# Patient Record
Sex: FEMALE | Race: WHITE | NOT HISPANIC OR LATINO | Employment: OTHER | URBAN - METROPOLITAN AREA
[De-identification: names, ages, dates, MRNs, and addresses within clinical notes are randomized per-mention and may not be internally consistent; named-entity substitution may affect disease eponyms.]

---

## 2018-08-03 ENCOUNTER — RECORDS - HEALTHEAST (OUTPATIENT)
Dept: LAB | Facility: CLINIC | Age: 83
End: 2018-08-03

## 2018-08-03 LAB
ANION GAP SERPL CALCULATED.3IONS-SCNC: 13 MMOL/L (ref 5–18)
BUN SERPL-MCNC: 24 MG/DL (ref 8–28)
CALCIUM SERPL-MCNC: 10.5 MG/DL (ref 8.5–10.5)
CHLORIDE BLD-SCNC: 107 MMOL/L (ref 98–107)
CO2 SERPL-SCNC: 21 MMOL/L (ref 22–31)
CREAT SERPL-MCNC: 1.06 MG/DL (ref 0.6–1.1)
GFR SERPL CREATININE-BSD FRML MDRD: 49 ML/MIN/1.73M2
GLUCOSE BLD-MCNC: 135 MG/DL (ref 70–125)
POTASSIUM BLD-SCNC: 3.6 MMOL/L (ref 3.5–5)
SODIUM SERPL-SCNC: 141 MMOL/L (ref 136–145)

## 2018-08-09 ENCOUNTER — COMMUNICATION - HEALTHEAST (OUTPATIENT)
Dept: OTHER | Facility: CLINIC | Age: 83
End: 2018-08-09

## 2018-08-09 ENCOUNTER — HOSPITAL ENCOUNTER (OUTPATIENT)
Dept: PHYSICAL MEDICINE AND REHAB | Facility: CLINIC | Age: 83
Discharge: HOME | End: 2018-08-09
Attending: PAIN MEDICINE

## 2018-08-09 DIAGNOSIS — M80.00XA AGE-RELATED OSTEOPOROSIS WITH CURRENT PATHOLOGICAL FRACTURE: ICD-10-CM

## 2018-08-09 DIAGNOSIS — S22.000A COMPRESSION FRACTURE OF THORACIC VERTEBRA (H): ICD-10-CM

## 2018-08-09 DIAGNOSIS — R29.2 HYPERREFLEXIA: ICD-10-CM

## 2018-08-09 DIAGNOSIS — K59.03 DRUG-INDUCED CONSTIPATION: ICD-10-CM

## 2018-08-09 RX ORDER — AMOXICILLIN 250 MG
2 CAPSULE ORAL DAILY
Refills: 0 | Status: SHIPPED | COMMUNITY
Start: 2018-08-09 | End: 2024-08-17

## 2018-08-09 RX ORDER — CALCITONIN SALMON 200 [IU]/.09ML
1 SPRAY, METERED NASAL DAILY
Qty: 3.7 ML | Refills: 0 | Status: SHIPPED | OUTPATIENT
Start: 2018-08-09 | End: 2024-08-17

## 2018-08-09 RX ORDER — AMLODIPINE BESYLATE 5 MG/1
5 TABLET ORAL DAILY
Status: SHIPPED | COMMUNITY
Start: 2018-08-09 | End: 2024-08-17

## 2018-08-09 RX ORDER — NAPROXEN SODIUM 220 MG
220 TABLET ORAL 2 TIMES DAILY WITH MEALS
Status: SHIPPED | COMMUNITY
Start: 2018-08-09 | End: 2024-08-17

## 2018-08-09 RX ORDER — POLYETHYLENE GLYCOL 3350 17 G/17G
17 POWDER, FOR SOLUTION ORAL DAILY
Status: SHIPPED | COMMUNITY
Start: 2018-08-09 | End: 2024-08-17

## 2018-08-09 RX ORDER — HYDROCODONE BITARTRATE AND ACETAMINOPHEN 5; 325 MG/1; MG/1
1 TABLET ORAL EVERY 4 HOURS PRN
Qty: 20 TABLET | Refills: 0 | Status: SHIPPED | OUTPATIENT
Start: 2018-08-09 | End: 2024-08-17

## 2018-08-09 RX ORDER — ACETAMINOPHEN 325 MG/1
650 TABLET ORAL EVERY 6 HOURS PRN
Status: SHIPPED | COMMUNITY
Start: 2018-08-09 | End: 2024-08-17

## 2018-08-09 ASSESSMENT — MIFFLIN-ST. JEOR: SCORE: 995.72

## 2018-08-10 ENCOUNTER — AMBULATORY - HEALTHEAST (OUTPATIENT)
Dept: NEUROSURGERY | Facility: CLINIC | Age: 83
End: 2018-08-10

## 2018-08-10 ENCOUNTER — AMBULATORY - HEALTHEAST (OUTPATIENT)
Dept: PHYSICAL MEDICINE AND REHAB | Facility: CLINIC | Age: 83
End: 2018-08-10

## 2018-08-10 ENCOUNTER — COMMUNICATION - HEALTHEAST (OUTPATIENT)
Dept: NEUROSURGERY | Facility: CLINIC | Age: 83
End: 2018-08-10

## 2018-08-10 ENCOUNTER — RECORDS - HEALTHEAST (OUTPATIENT)
Dept: ADMINISTRATIVE | Facility: OTHER | Age: 83
End: 2018-08-10

## 2018-08-10 DIAGNOSIS — S22.029A: ICD-10-CM

## 2018-08-10 DIAGNOSIS — S22.089A UNSPECIFIED FRACTURE OF T11-T12 VERTEBRA, INITIAL ENCOUNTER FOR CLOSED FRACTURE (H): ICD-10-CM

## 2018-08-14 ENCOUNTER — HOSPITAL ENCOUNTER (OUTPATIENT)
Dept: RADIOLOGY | Facility: HOSPITAL | Age: 83
Discharge: HOME | End: 2018-08-14
Attending: SURGERY

## 2018-08-14 ENCOUNTER — HOSPITAL ENCOUNTER (OUTPATIENT)
Dept: CT IMAGING | Facility: HOSPITAL | Age: 83
Discharge: HOME | End: 2018-08-14
Attending: SURGERY

## 2018-08-14 DIAGNOSIS — S22.029A: ICD-10-CM

## 2018-08-15 ENCOUNTER — COMMUNICATION - HEALTHEAST (OUTPATIENT)
Dept: ADMINISTRATIVE | Facility: CLINIC | Age: 83
End: 2018-08-15

## 2018-08-15 ENCOUNTER — OFFICE VISIT - HEALTHEAST (OUTPATIENT)
Dept: NEUROSURGERY | Facility: CLINIC | Age: 83
End: 2018-08-15

## 2018-08-15 DIAGNOSIS — S22.001A CLOSED BURST FRACTURE OF THORACIC VERTEBRA, INITIAL ENCOUNTER (H): ICD-10-CM

## 2018-08-15 ASSESSMENT — MIFFLIN-ST. JEOR: SCORE: 979.74

## 2018-08-17 ENCOUNTER — COMMUNICATION - HEALTHEAST (OUTPATIENT)
Dept: NEUROSURGERY | Facility: CLINIC | Age: 83
End: 2018-08-17

## 2018-08-17 ENCOUNTER — AMBULATORY - HEALTHEAST (OUTPATIENT)
Dept: ENDOCRINOLOGY | Facility: CLINIC | Age: 83
End: 2018-08-17

## 2018-08-17 DIAGNOSIS — M81.0 SENILE OSTEOPOROSIS: ICD-10-CM

## 2018-08-28 ENCOUNTER — COMMUNICATION - HEALTHEAST (OUTPATIENT)
Dept: NEUROSURGERY | Facility: CLINIC | Age: 83
End: 2018-08-28

## 2018-09-11 ENCOUNTER — COMMUNICATION - HEALTHEAST (OUTPATIENT)
Dept: ENDOCRINOLOGY | Facility: CLINIC | Age: 83
End: 2018-09-11

## 2018-09-26 ENCOUNTER — OFFICE VISIT - HEALTHEAST (OUTPATIENT)
Dept: NEUROSURGERY | Facility: CLINIC | Age: 83
End: 2018-09-26

## 2018-09-26 ENCOUNTER — HOSPITAL ENCOUNTER (OUTPATIENT)
Dept: RADIOLOGY | Facility: HOSPITAL | Age: 83
Discharge: HOME | End: 2018-09-26
Attending: SURGERY

## 2018-09-26 ENCOUNTER — HOSPITAL ENCOUNTER (OUTPATIENT)
Dept: CT IMAGING | Facility: HOSPITAL | Age: 83
Discharge: HOME | End: 2018-09-26
Attending: SURGERY

## 2018-09-26 DIAGNOSIS — S22.001A CLOSED BURST FRACTURE OF THORACIC VERTEBRA, INITIAL ENCOUNTER (H): ICD-10-CM

## 2018-09-26 DIAGNOSIS — S22.001A BURST FRACTURE OF THORACIC VERTEBRA (H): ICD-10-CM

## 2018-09-26 RX ORDER — ASPIRIN 81 MG/1
81 TABLET, CHEWABLE ORAL DAILY
Status: ON HOLD | COMMUNITY
Start: 2018-09-26 | End: 2024-08-17

## 2018-09-26 ASSESSMENT — MIFFLIN-ST. JEOR: SCORE: 979.74

## 2018-11-12 ENCOUNTER — HOSPITAL ENCOUNTER (OUTPATIENT)
Dept: CT IMAGING | Facility: HOSPITAL | Age: 83
Discharge: HOME | End: 2018-11-12
Attending: SURGERY

## 2018-11-12 ENCOUNTER — COMMUNICATION - HEALTHEAST (OUTPATIENT)
Dept: TELEHEALTH | Facility: CLINIC | Age: 83
End: 2018-11-12

## 2018-11-12 ENCOUNTER — OFFICE VISIT - HEALTHEAST (OUTPATIENT)
Dept: NEUROSURGERY | Facility: CLINIC | Age: 83
End: 2018-11-12

## 2018-11-12 ENCOUNTER — HOSPITAL ENCOUNTER (OUTPATIENT)
Dept: RADIOLOGY | Facility: HOSPITAL | Age: 83
Discharge: HOME | End: 2018-11-12
Attending: SURGERY

## 2018-11-12 DIAGNOSIS — S22.001A BURST FRACTURE OF THORACIC VERTEBRA (H): ICD-10-CM

## 2018-11-12 DIAGNOSIS — S22.001G: ICD-10-CM

## 2018-11-12 ASSESSMENT — MIFFLIN-ST. JEOR: SCORE: 979.74

## 2018-11-13 ENCOUNTER — COMMUNICATION - HEALTHEAST (OUTPATIENT)
Dept: NEUROSURGERY | Facility: CLINIC | Age: 83
End: 2018-11-13

## 2018-11-15 ENCOUNTER — COMMUNICATION - HEALTHEAST (OUTPATIENT)
Dept: NEUROSURGERY | Facility: CLINIC | Age: 83
End: 2018-11-15

## 2018-12-20 ENCOUNTER — OFFICE VISIT - HEALTHEAST (OUTPATIENT)
Dept: ENDOCRINOLOGY | Facility: CLINIC | Age: 83
End: 2018-12-20

## 2018-12-20 DIAGNOSIS — M81.0 SENILE OSTEOPOROSIS: ICD-10-CM

## 2018-12-20 ASSESSMENT — MIFFLIN-ST. JEOR: SCORE: 916.23

## 2019-01-21 ENCOUNTER — RECORDS - HEALTHEAST (OUTPATIENT)
Dept: ADMINISTRATIVE | Facility: OTHER | Age: 84
End: 2019-01-21

## 2019-03-06 ENCOUNTER — RECORDS - HEALTHEAST (OUTPATIENT)
Dept: LAB | Facility: CLINIC | Age: 84
End: 2019-03-06

## 2019-03-07 LAB
ANION GAP SERPL CALCULATED.3IONS-SCNC: 12 MMOL/L (ref 5–18)
BUN SERPL-MCNC: 24 MG/DL (ref 8–28)
CALCIUM SERPL-MCNC: 9.9 MG/DL (ref 8.5–10.5)
CHLORIDE BLD-SCNC: 107 MMOL/L (ref 98–107)
CO2 SERPL-SCNC: 25 MMOL/L (ref 22–31)
CREAT SERPL-MCNC: 1.14 MG/DL (ref 0.6–1.1)
GFR SERPL CREATININE-BSD FRML MDRD: 45 ML/MIN/1.73M2
GLUCOSE BLD-MCNC: 119 MG/DL (ref 70–125)
POTASSIUM BLD-SCNC: 3.6 MMOL/L (ref 3.5–5)
SODIUM SERPL-SCNC: 144 MMOL/L (ref 136–145)

## 2021-06-01 VITALS — BODY MASS INDEX: 25.01 KG/M2 | HEIGHT: 62 IN | WEIGHT: 135.9 LBS

## 2021-06-01 VITALS — HEIGHT: 61 IN | BODY MASS INDEX: 25.49 KG/M2 | WEIGHT: 135 LBS

## 2021-06-02 VITALS — HEIGHT: 59 IN | WEIGHT: 128 LBS | BODY MASS INDEX: 25.8 KG/M2

## 2021-06-02 VITALS — WEIGHT: 135 LBS | HEIGHT: 61 IN | BODY MASS INDEX: 25.49 KG/M2

## 2021-06-02 VITALS — BODY MASS INDEX: 25.49 KG/M2 | WEIGHT: 135 LBS | HEIGHT: 61 IN

## 2021-06-16 PROBLEM — M81.0 SENILE OSTEOPOROSIS: Status: ACTIVE | Noted: 2018-08-17

## 2021-06-19 NOTE — PROGRESS NOTES
Neurosurgery consultation was requested by: Dr.Bill Dailey   Pain: lower back   Radicular Pain is present: lower back left leg down to her left foot and toes   Lhermitte sign: no  Motor complaints: no  Sensory complaints: left foot and toes when she wakes up  Gait and balance issues: no  Bowel or bladder issues: yes   Duration of SX is: 1 month   The symptoms are worse with: waking up   The symptoms are better with: sitting and ling down   Injury: fall and her  had stroke and tried to lift him up   Severity is: mild to moderate   Patient has tried the following conservative measures: none   ANGELIA score is: 58%  DOYLE Francisco MA

## 2021-06-19 NOTE — PROGRESS NOTES
ASSESSMENT: Lora Ansari is a 85 y.o. female who presents for consultation at the request of HE PCP Stephen Becker MD, with a past medical history significant for seizures and essential hypertension who presents today for new patient evaluation of T2 compression fracture and T12 burst compression fracture with retropulsion and severe low back pain:    -With the compression fracture T12 having retropulsion into the spinal canal as well as hyperreflexia at right L4 and S1 I have ordered a TLSO that she should wear at all times except for lying in bed until she sees neurosurgery.  She does have full strength and her toes are downgoing.  We discussed the importance of not flexing at the lumbar spine and not lifting any more than a gallon of milk.      ANGELIA Score: 62    WHO 5: 15       Diagnoses and all orders for this visit:    Compression fracture of thoracic vertebra (H)  -     HYDROcodone-acetaminophen 5-325 mg per tablet; Take 1 tablet by mouth every 4 (four) hours as needed for pain.  Dispense: 20 tablet; Refill: 0  -     Ambulatory referral to Neurosurgery  -     Ambulatory referral for Orthotics / Prosthetics [facility]  -     calcitonin, salmon, (MIACALCIN) 200 unit/actuation nasal spray; Apply 1 spray into alternating nostrils daily.  Dispense: 3.7 mL; Refill: 0    Hyperreflexia  -     Ambulatory referral to Neurosurgery  -     Ambulatory referral for Orthotics / Prosthetics [facility]    Drug-induced constipation  -     senna-docusate (SENNOSIDES-DOCUSATE SODIUM) 8.6-50 mg tablet; Take 2 tablets by mouth daily.; Refill: 0    Age-related osteoporosis with current pathological fracture  -     Ambulatory referral to Osteoporosis Care      PLAN:  Reviewed spine anatomy and disease process. Discussed diagnosis and treatment options with the patient today. A shared decision making model was used.  The patient's values and choices were respected. The following represents what was discussed and decided upon by  the provider and the patient.      -DIAGNOSTIC TESTS:  Images were personally reviewed and interpreted and explained to patient today using spine model.   --I have reviewed her lumbar x-ray dated 8/3/2018 which shows a moderate to severe compression fracture in T12.  -I have also reviewed with the patient the images of the thoracic MRI which shows a subacute to acute burst fracture of the T12 vertebra with posterior cortical retropulsion into the spinal canal.  There does not appear to be impingement on the cord from the MRI.  Of note on the MRI it also shows a subacute T2 compression fracture as well as a chronic and healed T5 compression fracture.    -PHYSICAL THERAPY: We will hold off on physical therapy until after the patient sees neurosurgery.  -I have ordered a TLSO for stabilization that she should wear all the time except for when lying flat in bed until she sees neurosurgery.      -MEDICATIONS: Patient finds that hydrocodone with acetaminophen has been helpful for her pain and her ability to sleep.  She was given a small prescription of this by her primary care provider.  I have brought in the  and note that this is the only prescription that she has had.  I will reorder this prescription and give her 20 tablets.  -I have also ordered intranasal calcitonin that she should use once a day in one nostril daily alternating nostrils every other day.  -I have also ordered senna with docusate 2 tabs daily for constipation.  She is already taking MiraLAX for constipation prior to being on pain medication.  Discussed multiple medication options today with patient. Discussed risks, side effects, and proper use of medications. Patient verbalized understanding.    -INTERVENTIONS: Not ordered any interventions today.  Discussed risks and benefits of injections with patient today.    -Consult: I have ordered a consult for neurosurgery as well as the osteoporosis clinic.    -PATIENT EDUCATION:  45 minutes of total  visit time was spent face to face with the patient today, greater than 50% of total time spent with patient was spent on counseling, education, and coordinating care.   -10 minutes spent outside of visit time, non-face-to-face time, reviewing chart.    -FOLLOW-UP:   I will have her follow-up in 1 week to evaluate how she is doing.    Advised patient to call the [facility] if symptoms worsen or you have problems controlling bladder and bowel function.   ______________________________________________________________________    SUBJECTIVE:  HPI:  Lora Ansari  Is a 85 y.o. female who presents today for new patient evaluation of low back pain that she has had for the last 3 weeks.  Her son and daughter in the entirety of the visit today.  Weeks ago while gardening she reports that she fell and had some back pain after this.  On July 23, 2018 her daughter took her to her primary care provider and physical therapy was ordered.  As the patient was having extreme amounts of pain therapy asked that imaging be done before further physical therapy.  On July 28, 2018 her  unfortunately had a stroke and fell as she was trying to pick him up onto a stool she fell into the wall and had increased low back pain without radiation into her legs.  She reports that the pain is very severe and sharp.  She also reports abdominal pain that she has had for many months that she thinks is secondary to constipation.  It is better with laying down and worse with any sort of movement.    -Treatment to Date: X-ray and MRI of the lumbar spine as mentioned above    -Medications: She has been taking Tylenol, ibuprofen, hydrocodone/acetaminophen 5-325.  I have re-ordered her hydrocodone acetaminophen and asked that she not take Tylenol when she is taking this.    No current outpatient prescriptions on file prior to encounter.     No current facility-administered medications on file prior to encounter.        No Known Allergies    Past  "Medical History:   Diagnosis Date     Hypertension      Seizures (H) 1990s    no longer on meds for seizures        Patient Active Problem List   Diagnosis     Seizures (H)     Essential hypertension       Past Surgical History:   Procedure Laterality Date     TONSILLECTOMY  1941       Family History   Problem Relation Age of Onset     Coronary artery disease Father        Reviewed past medical, surgical, and family history with patient found on new patient intake packet located in [employer]MR [employer] tab.     SOCIAL HX: He does not smoke, drink alcohol, or use recreational drugs.   is in the hospital secondary to stroke.  She has very supportive son and daughter in the office today.    ROS:  Specifically negative for bowel/bladder dysfunction, balance changes, headache, dizziness, foot drop, fevers, chills, appetite changes, nausea/vomiting, unexplained weight loss. Otherwise 13 systems reviewed are negative. Please see the patient's intake questionnaire from today for details.    OBJECTIVE:  /82 (Patient Site: Left Arm, Patient Position: Sitting, Cuff Size: Adult Regular)  Pulse 81  Temp 97.8  F (36.6  C) (Oral)   Resp 18  Ht 5' 1.75\" (1.568 m)  Wt 135 lb 14.4 oz (61.6 kg)  SpO2 97%  Breastfeeding? No  BMI 25.06 kg/m2    PHYSICAL EXAMINATION:    --CONSTITUTIONAL:  Vital signs as above.  No acute distress.  The patient is well nourished and well groomed.  --PSYCHIATRIC:  Appropriate mood and affect. The patient is awake, alert, oriented to person, place, time and answering questions appropriately with clear speech.    --SKIN:  Skin over the face, bilateral lower extremities, and posterior torso is clean, dry, intact without rashes.    --RESPIRATORY: Normal rhythm and effort. No abnormal accessory muscle breathing patterns noted.   --ABDOMINAL:  Soft, non-distended, non-tender throughout all quadrants.   --STANDING EXAMINATION:  Normal lumbar lordosis noted, no lateral shift.  --MUSCULOSKELETAL: Lumbar " spine inspection reveals no evidence of deformity. Range of motion is not limited in lumbar flexion, extension, lateral rotation, although she does have pain with any of these movements.  She does have tenderness to palpation of the lumbar spine and low thoracic spine, however compression over the vertebra does not increase pain peer. Straight leg raising in the supine position is negative to radicular pain. Sciatic notch non-tender.  --GROSS MOTOR: Gait is non-antalgic. Easily arises from a seated position.  Toe and heel walking are difficult for her to do secondary to pain although she is able to get on toes and heels for a moment.  --LOWER EXTREMITY MOTOR TESTING:  Plantar flexion left 5/5, right 5/5   Dorsiflexion left 5/5, right 5/5   Great toe MTP extension left 5/5, right 4/5  Knee flexion left 5/5, right 5/5  Knee extension left 5/5, right 5/5   Hip flexion left 5/5, right 5/5  Hip abduction left 5/5, right 5/5  Hip adduction left 5/5, right 5/5   --NEUROLOGICAL:  2/4 left patellar and achilles reflexes.  3/4 right patellar and Achilles reflexes.  Sensation to light touch is intact in the bilateral L4, L5, and S1 dermatomes. Babinski is negative. No clonus.  Negative Andres reflex bilaterally.  --VASCULAR:  2/4 dorsalis pedis and posterior tibialsi pulses bilaterally.  Bilateral lower extremities are warm.  There is no pitting edema of the bilateral lower extremities.      Imaging: Lumbar spine Imaging was personally reviewed and interpreted today. The images were shown to the patient and the findings were explained using a spine model.      Lumbar x-ray showed compression fracture at T12.  Thoracic MRI showed chronic compression fracture at T5, subacute compression fracture at T2 with no retropulsion.  It also showed a T12 burst fracture with retropulsion of about 40% into the spinal canal, although did not seem to compromise the spinal cord.

## 2021-06-19 NOTE — PROGRESS NOTES
NEUROSURGERY CONSULTATION NOTE    8/15/2018     Lora Ansari is a 85 y.o. female who is sent to us in consultation by Stephen Oconnor*   for evaluation of T12 compression fx.         CONSULTATION ASSESSMENT AND PLAN:    86 yo female presents with a T12 burst fx after minor fall into wall vs unprovoked. Patient pain improving.     Recommend TLSO placement today   Recommend sooner appointment with endocrinology for osteoporosis follow-up  Recommend f/u in 6 weeks with CT and Xray thoracic spine   If any worsening pain, numbness and weakness, bowel or bladder changes she is to call the clinic.     I spent more than 45 minutes in this apt, examining the pt, reviewing the scans, reviewing notes from chart, discussing treatment options with risks and benefits and coordinating care. >50 % clinic time was spent in face to face counseling and coordinating care    Jasmine Fofana        HPI:  86 yo female presents after minor injury with T12 burst fx. Armani noted back and left leg pain (lateral leg to the knee only) around 7/23. She had no injury prior to the onset. She was seen by her PCP and was dx with sciatica and set for PT. She then was helping her  who had a prior stroke move fell backwards into a wall on 7/28. Unclear if the fx occurred as a result of this or back on 7/23. Nonetheless the fall into the wall worsened her pain. She underwent a MRI and an xray showing a T12 burst fx. She was started on MIACALCIN nasal spray. A TLSO was ordered but she has not gotten that yet.     Today she complains of ongoing low back and left leg pain. The left leg pain is located her in her lateral left thigh only. She has occasional left foot numbness. No leg weakness. She is constipated but no other bowel or bladder difficlties.   The Vicodin improves her pain and overall they are improving. The pain is worse when getting out of bed and in the AM. imporved with laying down.    No antiplatets or  anticoagulatns    Prior Spine Surgery: No    Past Medical History:   Diagnosis Date     Hypertension      Seizures (H) 1990s    no longer on meds for seizures     Past Surgical History:   Procedure Laterality Date     OOPHORECTOMY      2014     TONSILLECTOMY  1941       REVIEW OF SYSTEMS:  ROS reviewed with pt as documented on pt health form of 8/15/2018.    Negative cardiac, pulmonary, hematological with exception of neurological as per HPI.       MEDICATIONS:  Current Outpatient Prescriptions   Medication Sig Dispense Refill     acetaminophen (TYLENOL) 325 MG tablet Take 650 mg by mouth every 6 (six) hours as needed for pain.       amLODIPine (NORVASC) 5 MG tablet Take 5 mg by mouth daily.       calcitonin, salmon, (MIACALCIN) 200 unit/actuation nasal spray Apply 1 spray into alternating nostrils daily. 3.7 mL 0     HYDROcodone-acetaminophen 5-325 mg per tablet Take 1 tablet by mouth every 4 (four) hours as needed for pain. 20 tablet 0     naproxen sodium (ALEVE) 220 MG tablet Take 220 mg by mouth 2 (two) times a day with meals.       polyethylene glycol (GLYCOLAX) 17 gram/dose powder Take 17 g by mouth daily.       senna-docusate (SENNOSIDES-DOCUSATE SODIUM) 8.6-50 mg tablet Take 2 tablets by mouth daily.  0     No current facility-administered medications for this visit.          ALLERGIES/SENSITIVITIES:     Denies allergies to any medications.    PERTINENT SOCIAL HISTORY:   Social History     Social History     Marital status:      Spouse name: N/A     Number of children: N/A     Years of education: N/A     Social History Main Topics     Smoking status: Never Smoker     Smokeless tobacco: Never Used     Alcohol use No     Drug use: None     Sexual activity: Not Asked     Other Topics Concern     None     Social History Narrative         FAMILY HISTORY:  Family History   Problem Relation Age of Onset     Stroke Mother      Coronary artery disease Father      Heart attack Father      Cancer Sister      "    PHYSICAL EXAM:   Constitutional: /89  Pulse 98  Ht 5' 1\" (1.549 m)  Wt 135 lb (61.2 kg)  SpO2 97%  BMI 25.51 kg/m2     Mental Status: A & O in no acute distress.  Affect is appropriate.  Speech is fluent.  Recent and remote memory are intact.  Attention span and concentration are normal.     Cranial Nerves: CN1: grossly intact per patient recall. CN2: No funduscopic exam performed. CN3,4 & 6: Pupillary light response, lateral and vertical gaze normal.  No nystagmus.  Visual fields are full to confrontation. CN5: Intact to touch CN7: No facial weakness, smile, facial symmetry intact. CN8: Intact to spoken voice. CN9&10: Gag reflex, uvula midline, palate rises with phonation. CN11: Shoulder shrug 5/5 intact bilaterally. CN12: Tongue midline and moves freely from side to side.     Motor: No pronator drift of upper extremity. Normal bulk and tone all muscle groups of upper and lower extremities. 5/5     Sensory: Sensation intact bilaterally to light touch.      Coordination:  Heel/toe/  gait intact. intact  tandem gait      Reflexes; supinator, biceps, triceps, knee/ ankle jerk intact 2+. No hoffmans/ No babinski  Unable to fully test clonus d/t patient participation     Tenderness to palpation over T12    IMAGING: I personally reviewed all radiographic images    MRI and CT thoracic spine: T12 acute burst fx with retropulsed fragment, R>L cord effacement, unclear if mild cord compression.       Cc:   Stephen Becker MD  [address] [place] [address]  "

## 2021-06-20 NOTE — PROGRESS NOTES
"NEUROSURGERY FOLLOWUP  NOTE    Lora Ansari presents with a T12 burst fx after minor fall into wall vs unprovoked. She was last seen on 8/15/18.She was recommended TLSO brace and return to clinic in 6 weeks. She returns to day for f/u.She has not been compliant with the TLSO brace. She finds its uncomfortable so she doesn't wear it. She has soft brace that she bought herself that she sometimes wears.  Denies back or leg pain, leg numbness, leg weakness, urinary or bowel incontinence or urianry retention.      The pts PMH, PSH, ROS, Meds, Allergies, SH, FH are all unchanged and summarized in the pts health history from last visit        PHYSICAL EXAM:   Constitutional: BP (!) 141/91  Pulse 98  Ht 5' 1\" (1.549 m)  Wt 135 lb (61.2 kg)  SpO2 96%  BMI 25.51 kg/m2     Mental Status: A & O in no acute distress.  Affect is appropriate.  Speech is fluent.  Recent and remote memory are intact.  Attention span and concentration are normal.     Cranial Nerves: CN1: grossly intact per patient recall. CN2: No funduscopic exam performed. CN3,4 & 6: Pupillary light response, lateral and vertical gaze normal.  No nystagmus.  Visual fields are full to confrontation. CN5: Intact to touch CN7: No facial weakness, smile, facial symmetry intact. CN8: Intact to spoken voice. CN9&10: Gag reflex, uvula midline, palate rises with phonation. CN11: Shoulder shrug 5/5 intact bilaterally. CN12: Tongue midline and moves freely from side to side.     Motor: No pronator drift of upper extremity. Normal bulk and tone all muscle groups of upper and lower extremities.     Sensory: Sensation intact bilaterally to light touch.      Coordination:   Gait intact      Reflexes;  knee/ ankle jerk intact.      IMAGING:   I personally reviewed all radiographic images        CONSULTATION ASSESSMENT AND PLAN:    Lora Ansari presents with a T12 burst fx after minor fall into wall vs unprovoked. Imaging shows increased compression of known T12 " burst fx with worsening retropulsion and canal stenosis. Patient has been non-compliant with her brace. She overall is asymptomatic. Given worsening fx appearance on imaging will see her back in 6 weeks with repeat imaging. Still taking Miacalcin nasal spray. Severe osteoporosis on dexa. Awaiting endocrinology appointment.     I spent more than 15 minutes in this apt, examining the pt, reviewing the scans, reviewing notes from chart, discussing treatment options with risks and benefits and coordinating care. >50 % clinic time was spent in face to face counseling and coordinating care    Jasmine Fofana      CC:     Stephen Becker MD  [address] [place] [address]

## 2021-06-20 NOTE — PROGRESS NOTES
Pt is here follow up for Thoracic. Pt states that she is doing great. Pt denies ay symptoms.   ANGELIA: 18%  DOYLE Francisco MA

## 2021-06-21 NOTE — PROGRESS NOTES
"NEUROSURGERY FOLLOWUP  NOTE    Lora Ansari presents with a T12 burst fx after minor fall into wall vs unprovoked, today she reports her  had a stroke in July and she may have been helping to move him around. She was last seen on 9/26 -She was recommended TLSO brace and return to clinic in 6 weeks. She has been non-compliant with her brace.    The pts PMH, PSH, ROS, Meds, Allergies, SH, FH are all unchanged and summarized in the pts health history from last visit     ROS- 12 point review of systems is negative apart from back pain. She specifically denies numbness/tingling/weakness in her legs, changes in bowel or bladder habits, specifically denies issues with incontinence. Only complaint is back pain.     PHYSICAL EXAM:   Constitutional: /89   Pulse 89   Ht 5' 1\" (1.549 m)   Wt 135 lb (61.2 kg)   SpO2 98%   BMI 25.51 kg/m       Mental Status: A & O in no acute distress.  Affect is appropriate.  Speech is fluent.  Recent memory intact, possibly some difficulty with remote memory (did not recall being seen in august)  Attention span and concentration are normal.     Cranial Nerves: PERRL, EOMI, face symmetric, tongue midline, shoulder shrug equal No pronator drift, finger to nose smooth and accurate bilaterally     Motor: No pronator drift of upper extremity. Normal bulk and tone all muscle groups of upper and lower extremities.   Strength:  Hip flexors 5/5 right, 5/5 left   Quadriceps: 5/5 right, 5/5 left   Ankle dorsiflexion: 5/5 right, 5/5 left   Extensor hallicus longus: 5/5 right, 5/5 left   Ankle plantar flexion : 5/5 right, 5/5 left      Sensory: Sensation intact bilaterally to light touch.      Coordination:   Gait intact      Reflexes;  knee/ ankle jerk intact. No clonus     IMAGING:   I personally reviewed all radiographic images and reviewed with Dr. Greenberg. Awaiting radiology report, T12 fracture looks similar to prior.         ASSESSMENT AND PLAN:    Lora Ansari is here " for follow up after establishing care in August for T12 fracture.  Imaging shows increased compression of known T12 burst fx with worsening retropulsion and canal stenosis. She has been non-compliant with her brace, remains relatively asymptomatic of the retropulsion. Needs follow up with endocrine, will try and get her in sooner with Dr. Pena. Discussed s/sx to look for if concern for myelopathy. Recommend continuing lifting restrictions in light of her severe osteoporosis. Patient's daughter was present for today's visit and notes that her mom has difficulty remembering her restrictions.     Case discussed with Dr. Greenberg in Dr. Fofana's absence, recommends OP MRI of thoracic spine. If unable to tolerate, six weeks with repeat upright x-rays. Discussed with patient's daughter who will discuss with her mom.   Mariana Devlin      CC:     Stephen Becker MD  [address] [place] [address]

## 2021-06-22 NOTE — PROGRESS NOTES
Progress Note    Reason for Visit:  Chief Complaint     Osteoporosis          Progress Note:    HPI: This patient seen saltation at the request of the primary care physician because of osteoporosis.  Thank you for referring this pleasant 85-year-old female patient who came to the clinic accompanied by her daughter.    The patient lives with her  her  had a stroke in July she was trying to lift him up and then she felt a cracking pain in her back.    She was found to have fracture of her vertebrae.  She has fracture of T12 and 2 other fractures.    She also broke her wrist 10 years ago after a fall.    The patient is stressed after her  being in a nursing home after he had the stroke.    She takes aspirin to full aspirin a day because of the pain and she has a lot of acid reflux.    She also takes calcitonin nasal spray.  Blood pressure 140/74.  Heart sound showing some ectopics with ejection systolic murmur over the heart.      Component      Latest Ref Rng & Units 11/24/2015 1/5/2016 6/7/2017 8/3/2018   Sodium      136 - 145 mmol/L 142 143 143 141   Potassium      3.5 - 5.0 mmol/L 4.0 4.2 4.3 3.6   Chloride      98 - 107 mmol/L 105 107 110 (H) 107   CO2      22 - 31 mmol/L 28 25 23 21 (L)   Anion Gap, Calculation      5 - 18 mmol/L 9 11 10 13   Glucose      70 - 125 mg/dL 105 90 86 135 (H)   BUN      8 - 28 mg/dL 17 20 23 24   Creatinine      0.60 - 1.10 mg/dL 0.96 0.93 1.06 1.06   GFR MDRD Af Amer      >60 mL/min/1.73m2 >60 >60 60 (L) 60 (L)   GFR MDRD Non Af Amer      >60 mL/min/1.73m2 56 (L) 58 (L) 49 (L) 49 (L)   Bilirubin, Total      0.0 - 1.0 mg/dL       Calcium      8.5 - 10.5 mg/dL 10.2 10.4 10.0 10.5   Protein, Total      6.0 - 8.0 g/dL       ALBUMIN      3.5 - 5.0 g/dL       Alkaline Phosphatase      45 - 120 U/L       AST      0 - 40 U/L       ALT      0 - 45 U/L       Magnesium      1.8 - 2.6 mg/dL         Patient Profile:  85 y.o. female, postmenopausal, is here for the first bone  density test.   History of fractures - Yes;  Wrist and Thoracic vertebrae. Family history of osteoporosis - Yes;  sibling.  Family history of hip fracture: None. Smoking history - No. Osteoporosis treatment past -  No. Osteoporosis treatment current - No.  Chronic medical problems - Oophorectomy. High risk medications -  Anti-seizure;  Yes, in the Past.        Assessment:     1. The spine bone density L1-L4 with T-score -2.6.  2. Femoral bone densities show left femoral neck T- score -2.4 and right femoral neck T-score -2.3.  3. Trabecular bone score indicates poor trabecular bone architecture.        85 y.o. female with SEVERE OSTEOPOROSIS based on bone densitometry and the presence of a thoracic compression fracture..        Date: 11/12/2018 Department: [facility] CT Released By: Ada Sandoval RT (R) Authorizing: Jasmine Fofana MD   Study Result     [facility] RADIOLOGY     EXAM: CT THORACIC SPINE WO CONTRAST  LOCATION: [facility]  DATE/TIME: 11/12/2018 12:24 PM     INDICATION: Spine fracture, traumatic, thoracic.  COMPARISON: None.  TECHNIQUE: Routine without IV contrast. Multiplanar reformats.  Dose reduction techniques were used.     FINDINGS:   VERTEBRA: Severe chronic burst/compression fracture deformity T12 is similar in appearance apart from some slight interval increase in fragmentation of the ventral margin. Retropulsion into the canal is stable resulting in moderate narrowing of the canal   not obviously changed in the interval. Mild chronic superior endplate compressions of T2 through T5 are unchanged. No new fracture. Multilevel degenerative disc disease which is greatest at T8-T9 where it is marked with associated endplate sclerosis   which is relatively stable. No other significant canal or high-grade bony foraminal narrowing. 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     IMPRESSION:   CONCLUSION:  1.  Relatively stable, now chronic, marked  burst/compression fracture deformity of T12. Retropulsion of the canal is similar with similar moderate narrowing of the canal.     2.  Mild chronic superior endplate compressions of T2 through T5 are unchanged     3.  No new fracture.            Review of Systems:    Nervous System: No headache, dizziness, fainting or memory loss. No tingling sensation of hand or feet.  Ears: No hearing loss or ringing in the ears  Eyes: No blurring of vision, redness, itching or dryness.  Nose: No nosebleed or loss of smell  Mouth: No mouth sores or loss of taste  Throat: No hoarseness or difficulty swallowing  Neck: No enlarged thyroid or lymph nodes.  Heart: No chest pain, palpitation or irregular heartbeat. No swelling of hands or feet  Lungs: No shortness of breath, cough, night sweats, wheezing or hemoptysis.  Gastrointestinal: No nausea or vomiting, constipation or diarrhea.  No acid reflux, abdominal pain or blood in stools.  Kidney/Bladdr: No polyuria, polydipsia, nocturia or hematuria.  Genital/Sexual: No loss of libido  Skin: No rash, hair loss or hirsutism.  No abnormal striae  Muscles/Joints/Bones: No morning stiffness, muscle aches and pain or loss of height.    Current Medications:  Current Outpatient Medications   Medication Sig     amLODIPine (NORVASC) 5 MG tablet Take 5 mg by mouth daily.     aspirin 81 mg chewable tablet Chew 81 mg daily.     calcitonin, salmon, (MIACALCIN) 200 unit/actuation nasal spray Apply 1 spray into alternating nostrils daily.     acetaminophen (TYLENOL) 325 MG tablet Take 650 mg by mouth every 6 (six) hours as needed for pain.     HYDROcodone-acetaminophen 5-325 mg per tablet Take 1 tablet by mouth every 4 (four) hours as needed for pain.     naproxen sodium (ALEVE) 220 MG tablet Take 220 mg by mouth 2 (two) times a day with meals.     polyethylene glycol (GLYCOLAX) 17 gram/dose powder Take 17 g by mouth daily.     senna-docusate (SENNOSIDES-DOCUSATE SODIUM) 8.6-50 mg tablet Take 2  "tablets by mouth daily.       Patients Active Problems:  Patient Active Problem List   Diagnosis     Seizures (H)     Essential hypertension     Senile osteoporosis       History:   reports that  has never smoked. she has never used smokeless tobacco. She reports that she does not drink alcohol.   reports that  has never smoked. she has never used smokeless tobacco. She reports that she does not drink alcohol. Her drug history is not on file.  Social History     Tobacco Use   Smoking Status Never Smoker   Smokeless Tobacco Never Used      reports that  has never smoked. she has never used smokeless tobacco. She reports that she does not drink alcohol. Her drug history is not on file.  Social History     Substance and Sexual Activity   Sexual Activity Not on file     Past Medical History:   Diagnosis Date     Hypertension      Seizures (H) 1990s    no longer on meds for seizures     Family History   Problem Relation Age of Onset     Stroke Mother      Coronary artery disease Father      Heart attack Father      Cancer Sister      Past Medical History:   Diagnosis Date     Hypertension      Seizures (H) 1990s    no longer on meds for seizures     Past Surgical History:   Procedure Laterality Date     OOPHORECTOMY      2014     TONSILLECTOMY  1941       Vitals   height is 4' 11\" (1.499 m) and weight is 128 lb (58.1 kg). Her blood pressure is 140/74.         Exam  General appearance: The patient looked well, not in acute distress.  Eyes: no evidence of thyroid eye disease.   Retinal exam: No evidence of diabetic retinopathy.  Mouth and Throat: Normal  Neck: No evidence of thyromegaly, enlarged lymph node or tenderness  Chest: Trachea is central. Chest is clear to auscultation and percussion. Breat sounds are normal.  Cardiovascular exam: JVP is not raised. Heart sounds are normal, no murmurs or rub  Peripheral pulses are palpable.   Abdomen: No masses or tenderness.    Back: No vertebral tenderness or " kyphosis.  Extremities: No evidence of leg edema.   Skin: Normal to touch.  No abnormal striae  Neurologic exam:  Visual fields are intact by confrontation, grossly intact. No evidence of peripheral neuropathy.  Detailed foot exam normal.        Diagnosis:  No diagnosis found.    Orders:   No orders of the defined types were placed in this encounter.        Assessment and Plan: Osteoporosis I discussed the pathophysiology of the disease with the patient.  Patient had a bone DEXA scan which showed T score at the spine is -2.6 at the left hip -2.4 at the right -2.3.    I did advise the patient to take calcium around 300 mg daily plus vitamin D 2000 units a day creatinine is normal at 1.06 calcium 10.4.    Seems to be no secondary causes for the osteoporosis.    We will contact insurance to approve Prolia 60 mg subcutaneously every 6 months she is not a candidate for oral Fosamax because of the severe acid reflux that she has.    I also advised the patient to discontinue the current aspirin and just do not exceed 81 mg 3 times a week.    Hypertension on amlodipine 5 mg daily continue with that we will continue also his calcitonin to help control the pain the patient lost at least 1 inch in height.    She will return to clinic in 1 year.    I have reviewed and ordered clinical lab test    I have reviewed and ordered her medication as required.    I have reviewed her test results and advised with the performing physician.    I have reviewed the patient's old records.    I have reviewed and summarized the patient old records.

## 2021-09-27 ENCOUNTER — LAB REQUISITION (OUTPATIENT)
Dept: LAB | Facility: CLINIC | Age: 86
End: 2021-09-27

## 2021-09-27 ENCOUNTER — TRANSFERRED RECORDS (OUTPATIENT)
Dept: HEALTH INFORMATION MANAGEMENT | Facility: CLINIC | Age: 86
End: 2021-09-27
Payer: COMMERCIAL

## 2021-09-27 DIAGNOSIS — R41.3 OTHER AMNESIA: ICD-10-CM

## 2021-09-27 LAB
ALBUMIN SERPL-MCNC: 4.1 G/DL (ref 3.5–5)
ALP SERPL-CCNC: 94 U/L (ref 45–120)
ALT SERPL W P-5'-P-CCNC: 13 U/L (ref 0–45)
ANION GAP SERPL CALCULATED.3IONS-SCNC: 13 MMOL/L (ref 5–18)
AST SERPL W P-5'-P-CCNC: 19 U/L (ref 0–40)
BILIRUB SERPL-MCNC: 0.7 MG/DL (ref 0–1)
BUN SERPL-MCNC: 20 MG/DL (ref 8–28)
CALCIUM SERPL-MCNC: 10.3 MG/DL (ref 8.5–10.5)
CHLORIDE BLD-SCNC: 106 MMOL/L (ref 98–107)
CO2 SERPL-SCNC: 24 MMOL/L (ref 22–31)
CREAT SERPL-MCNC: 1.12 MG/DL (ref 0.6–1.1)
FOLATE SERPL-MCNC: 10 NG/ML
GFR SERPL CREATININE-BSD FRML MDRD: 44 ML/MIN/1.73M2
GLUCOSE BLD-MCNC: 98 MG/DL (ref 70–125)
POTASSIUM BLD-SCNC: 4 MMOL/L (ref 3.5–5)
PROT SERPL-MCNC: 7 G/DL (ref 6–8)
SODIUM SERPL-SCNC: 143 MMOL/L (ref 136–145)
TSH SERPL DL<=0.005 MIU/L-ACNC: 1.89 UIU/ML (ref 0.3–5)
VIT B12 SERPL-MCNC: 541 PG/ML (ref 213–816)

## 2021-09-27 PROCEDURE — 82040 ASSAY OF SERUM ALBUMIN: CPT | Performed by: PHYSICIAN ASSISTANT

## 2021-09-27 PROCEDURE — 84443 ASSAY THYROID STIM HORMONE: CPT | Performed by: PHYSICIAN ASSISTANT

## 2021-09-27 PROCEDURE — 82746 ASSAY OF FOLIC ACID SERUM: CPT | Performed by: PHYSICIAN ASSISTANT

## 2021-09-27 PROCEDURE — 82607 VITAMIN B-12: CPT | Performed by: PHYSICIAN ASSISTANT

## 2021-10-06 ENCOUNTER — TRANSFERRED RECORDS (OUTPATIENT)
Dept: HEALTH INFORMATION MANAGEMENT | Facility: CLINIC | Age: 86
End: 2021-10-06
Payer: COMMERCIAL

## 2021-10-15 ENCOUNTER — OFFICE VISIT (OUTPATIENT)
Dept: CARDIOLOGY | Facility: CLINIC | Age: 86
End: 2021-10-15
Payer: COMMERCIAL

## 2021-10-15 VITALS
DIASTOLIC BLOOD PRESSURE: 84 MMHG | BODY MASS INDEX: 26.64 KG/M2 | WEIGHT: 131.9 LBS | RESPIRATION RATE: 16 BRPM | SYSTOLIC BLOOD PRESSURE: 138 MMHG | HEART RATE: 72 BPM

## 2021-10-15 DIAGNOSIS — I10 ESSENTIAL HYPERTENSION: Primary | ICD-10-CM

## 2021-10-15 PROCEDURE — 99204 OFFICE O/P NEW MOD 45 MIN: CPT | Performed by: INTERNAL MEDICINE

## 2021-10-15 NOTE — PROGRESS NOTES
HEART CARE ENCOUNTER CONSULTATON NOTE      [facility]  [phone]      Assessment/Recommendations   Assessment/Plan: 88 year old female w/ HTN, severe osteoporosis/spinal stress fracture, seizures, and memory decline who is here for evaluation of heart murmur and irregular heart beat.     # Irregular heart beat - outpatient EKG w/ NSR at 80's and PAC's  - will get a 48 hour Holter to rule out underlying AF, as that would warrant a consideration of antiocagulaton    # Heart murmur - systolic, can't rule out flow murmur   - will obtain a screening TTE    If she has any arrhythmias, such as AF, or ventricular or valvular abnormality, it may be best to bring her and her son back for further discussion of management options, as given her cognitive decline, they are not sure how aggressive they would be w/ treatment options       History of Present Illness/Subjective    HPI: Lora Ansari is a 88 year old female w/ HTN, severe osteoporosis/spinal stress fracture, seizures, and memory decline who is here for evaluation of heart murmur and irregular heart beat.     She has no specific cardiac complaints, but overall, it appears that she has been declining over the past 3-4 years, mostly in terms of her cognitive performance, which fluctuates, but overall seems to be getting worse ever since her  has passed away. She denies CP/SOB/HIDALGO/syncope/orthopnea/PND but does get winded more easily, not able to walk as much as even a few years back, still able to do a half a mile or so, but then gets tired. She complains of occasional abdominal pains, possibly with some abdominal distention. No N/V/D/F/C/wt.changes. She was seen by a PCP who noted irregularity in her heart beat and a murmur, EKG was done demonstrating NSR at 80's w/ PAC's. She was referred to us for further evaluation.    Lives by herself, son's house is next door and he is the main caregiver. Never smoker or drinker. Retired  homemaker    Recent Echocardiogram Results:  None    Recent Coronary Angiogram Results:  None       Physical Examination  Review of Systems   Vitals: /84 (BP Location: Left arm, Patient Position: Sitting, Cuff Size: Adult Regular)   Pulse 72   Resp 16   Wt 59.8 kg (131 lb 14.4 oz)   BMI 26.64 kg/m    BMI= There is no height or weight on file to calculate BMI.  Wt Readings from Last 3 Encounters:   12/20/18 58.1 kg (128 lb)   11/12/18 61.2 kg (135 lb)   09/26/18 61.2 kg (135 lb)       General Appearance:   no distress, normal body habitus   ENT/Mouth: membranes moist, no oral lesions or bleeding gums.      EYES:  no scleral icterus, normal conjunctivae   Neck: no carotid bruits or thyromegaly   Chest/Lungs:   lungs are clear to auscultation, no rales or wheezing, no sternal scar, equal chest wall expansion    Cardiovascular:   Regular. Normal first and second heart sounds with 1/6 systolic murmur, no rubs, or gallops; the carotid, radial and posterior tibial pulses are intact, Jugular venous pressure 6, no edema bilaterally    Abdomen:  no organomegaly, masses, bruits, or tenderness; bowel sounds are present   Extremities: no cyanosis or clubbing   Skin: no xanthelasma, warm.    Neurologic: normal  bilateral, no tremors     Psychiatric: alert and oriented x3, calm        Please refer above for cardiac ROS details.        Medical History  Surgical History Family History Social History   No past medical history on file.  Past Surgical History:   Procedure Laterality Date     OOPHORECTOMY      2014     TONSILLECTOMY  1941     Family History   Problem Relation Age of Onset     Cerebrovascular Disease Mother      Coronary Artery Disease Father      Cancer Sister         Social History     Socioeconomic History     Marital status:      Spouse name: Not on file     Number of children: Not on file     Years of education: Not on file     Highest education level: Not on file   Occupational History     Not  on file   Tobacco Use     Smoking status: Never Smoker     Smokeless tobacco: Never Used   Substance and Sexual Activity     Alcohol use: No     Drug use: Not on file     Sexual activity: Not on file   Other Topics Concern     Not on file   Social History Narrative     Not on file     Social Determinants of Health     Financial Resource Strain:      Difficulty of Paying Living Expenses:    Food Insecurity:      Worried About Running Out of Food in the Last Year:      Ran Out of Food in the Last Year:    Transportation Needs:      Lack of Transportation (Medical):      Lack of Transportation (Non-Medical):    Physical Activity:      Days of Exercise per Week:      Minutes of Exercise per Session:    Stress:      Feeling of Stress :    Social Connections:      Frequency of Communication with Friends and Family:      Frequency of Social Gatherings with Friends and Family:      Attends Judaism Services:      Active Member of Clubs or Organizations:      Attends Club or Organization Meetings:      Marital Status:    Intimate Partner Violence:      Fear of Current or Ex-Partner:      Emotionally Abused:      Physically Abused:      Sexually Abused:            Medications  Allergies   Current Outpatient Medications   Medication Sig Dispense Refill     acetaminophen (TYLENOL) 325 MG tablet [ACETAMINOPHEN (TYLENOL) 325 MG TABLET] Take 650 mg by mouth every 6 (six) hours as needed for pain.       amLODIPine (NORVASC) 5 MG tablet [AMLODIPINE (NORVASC) 5 MG TABLET] Take 5 mg by mouth daily.       aspirin 81 mg chewable tablet [ASPIRIN 81 MG CHEWABLE TABLET] Chew 81 mg daily.       calcitonin, salmon, (MIACALCIN) 200 unit/actuation nasal spray [CALCITONIN, SALMON, (MIACALCIN) 200 UNIT/ACTUATION NASAL SPRAY] Apply 1 spray into alternating nostrils daily. 3.7 mL 0     HYDROcodone-acetaminophen 5-325 mg per tablet [HYDROCODONE-ACETAMINOPHEN 5-325 MG PER TABLET] Take 1 tablet by mouth every 4 (four) hours as needed for pain. 20  tablet 0     naproxen sodium (ALEVE) 220 MG tablet [NAPROXEN SODIUM (ALEVE) 220 MG TABLET] Take 220 mg by mouth 2 (two) times a day with meals.       polyethylene glycol (GLYCOLAX) 17 gram/dose powder [POLYETHYLENE GLYCOL (GLYCOLAX) 17 GRAM/DOSE POWDER] Take 17 g by mouth daily.       senna-docusate (SENNOSIDES-DOCUSATE SODIUM) 8.6-50 mg tablet [SENNA-DOCUSATE (SENNOSIDES-DOCUSATE SODIUM) 8.6-50 MG TABLET] Take 2 tablets by mouth daily.  0     No Known Allergies       Lab Results    Chemistry/lipid CBC Cardiac Enzymes/BNP/TSH/INR   No results for input(s): CHOL, HDL, LDL, TRIG, CHOLHDLRATIO in the last 08887 hours.  No results for input(s): LDL in the last 03947 hours.  Recent Labs   Lab Test 09/27/21  1312      POTASSIUM 4.0   CHLORIDE 106   CO2 24   GLC 98   BUN 20   CR 1.12*   GFRESTIMATED 44*   CINDY 10.3     Recent Labs   Lab Test 09/27/21  1312 03/06/19  1711 08/03/18  1554   CR 1.12* 1.14* 1.06     No results for input(s): A1C in the last 95724 hours.       No results for input(s): WBC, HGB, HCT, MCV, PLT in the last 02710 hours.  No results for input(s): HGB in the last 12004 hours. No results for input(s): TROPONINI in the last 63656 hours.  No results for input(s): BNP, NTBNPI, NTBNP in the last 72978 hours.  Recent Labs   Lab Test 09/27/21  1312   TSH 1.89     No results for input(s): INR in the last 39753 hours.     Thien Levi MD

## 2021-10-15 NOTE — PATIENT INSTRUCTIONS
It is very nice to meet you today!    As we have discussed, let's work things up a bit more with a Holter monitor and an echocardiogram (US of the heart)    If everything looks good, we may not need much further cardiac work up. If anything is abnormal, let's meet again, to discuss what would be consistent with your goals of care.    I also would recommend further work up for your abdominal complaints with your PCP    As for the diet, ideally would stick to low Na (2g/day) diet.

## 2021-10-15 NOTE — LETTER
10/15/2021    Stephen Becker MD  [facility] [address]  [facility] [address]    RE: Lora SANDOVAL Coty       Dear Colleague,    I had the pleasure of seeing Lora Ansari in the [facility].      HEART CARE ENCOUNTER CONSULTATON NOTE      [facility]  [phone]      Assessment/Recommendations   Assessment/Plan: 88 year old female w/ HTN, severe osteoporosis/spinal stress fracture, seizures, and memory decline who is here for evaluation of heart murmur and irregular heart beat.     # Irregular heart beat - outpatient EKG w/ NSR at 80's and PAC's  - will get a 48 hour Holter to rule out underlying AF, as that would warrant a consideration of antiocagulaton    # Heart murmur - systolic, can't rule out flow murmur   - will obtain a screening TTE    If she has any arrhythmias, such as AF, or ventricular or valvular abnormality, it may be best to bring her and her son back for further discussion of management options, as given her cognitive decline, they are not sure how aggressive they would be w/ treatment options       History of Present Illness/Subjective    HPI: Lora Ansari is a 88 year old female w/ HTN, severe osteoporosis/spinal stress fracture, seizures, and memory decline who is here for evaluation of heart murmur and irregular heart beat.     She has no specific cardiac complaints, but overall, it appears that she has been declining over the past 3-4 years, mostly in terms of her cognitive performance, which fluctuates, but overall seems to be getting worse ever since her  has passed away. She denies CP/SOB/HIADLGO/syncope/orthopnea/PND but does get winded more easily, not able to walk as much as even a few years back, still able to do a half a mile or so, but then gets tired. She complains of occasional abdominal pains, possibly with some abdominal distention. No N/V/D/F/C/wt.changes. She was seen  by a PCP who noted irregularity in her heart beat and a murmur, EKG was done demonstrating NSR at 80's w/ PAC's. She was referred to us for further evaluation.    Lives by herself, son's house is next door and he is the main caregiver. Never smoker or drinker. Retired homemaker    Recent Echocardiogram Results:  None    Recent Coronary Angiogram Results:  None       Physical Examination  Review of Systems   Vitals: /84 (BP Location: Left arm, Patient Position: Sitting, Cuff Size: Adult Regular)   Pulse 72   Resp 16   Wt 59.8 kg (131 lb 14.4 oz)   BMI 26.64 kg/m    BMI= There is no height or weight on file to calculate BMI.  Wt Readings from Last 3 Encounters:   12/20/18 58.1 kg (128 lb)   11/12/18 61.2 kg (135 lb)   09/26/18 61.2 kg (135 lb)       General Appearance:   no distress, normal body habitus   ENT/Mouth: membranes moist, no oral lesions or bleeding gums.      EYES:  no scleral icterus, normal conjunctivae   Neck: no carotid bruits or thyromegaly   Chest/Lungs:   lungs are clear to auscultation, no rales or wheezing, no sternal scar, equal chest wall expansion    Cardiovascular:   Regular. Normal first and second heart sounds with 1/6 systolic murmur, no rubs, or gallops; the carotid, radial and posterior tibial pulses are intact, Jugular venous pressure 6, no edema bilaterally    Abdomen:  no organomegaly, masses, bruits, or tenderness; bowel sounds are present   Extremities: no cyanosis or clubbing   Skin: no xanthelasma, warm.    Neurologic: normal  bilateral, no tremors     Psychiatric: alert and oriented x3, calm        Please refer above for cardiac ROS details.        Medical History  Surgical History Family History Social History   No past medical history on file.  Past Surgical History:   Procedure Laterality Date     OOPHORECTOMY      2014     TONSILLECTOMY  1941     Family History   Problem Relation Age of Onset     Cerebrovascular Disease Mother      Coronary Artery Disease Father       Cancer Sister         Social History     Socioeconomic History     Marital status:      Spouse name: Not on file     Number of children: Not on file     Years of education: Not on file     Highest education level: Not on file   Occupational History     Not on file   Tobacco Use     Smoking status: Never Smoker     Smokeless tobacco: Never Used   Substance and Sexual Activity     Alcohol use: No     Drug use: Not on file     Sexual activity: Not on file   Other Topics Concern     Not on file   Social History Narrative     Not on file     Social Determinants of Health     Financial Resource Strain:      Difficulty of Paying Living Expenses:    Food Insecurity:      Worried About Running Out of Food in the Last Year:      Ran Out of Food in the Last Year:    Transportation Needs:      Lack of Transportation (Medical):      Lack of Transportation (Non-Medical):    Physical Activity:      Days of Exercise per Week:      Minutes of Exercise per Session:    Stress:      Feeling of Stress :    Social Connections:      Frequency of Communication with Friends and Family:      Frequency of Social Gatherings with Friends and Family:      Attends Spiritism Services:      Active Member of Clubs or Organizations:      Attends Club or Organization Meetings:      Marital Status:    Intimate Partner Violence:      Fear of Current or Ex-Partner:      Emotionally Abused:      Physically Abused:      Sexually Abused:            Medications  Allergies   Current Outpatient Medications   Medication Sig Dispense Refill     acetaminophen (TYLENOL) 325 MG tablet [ACETAMINOPHEN (TYLENOL) 325 MG TABLET] Take 650 mg by mouth every 6 (six) hours as needed for pain.       amLODIPine (NORVASC) 5 MG tablet [AMLODIPINE (NORVASC) 5 MG TABLET] Take 5 mg by mouth daily.       aspirin 81 mg chewable tablet [ASPIRIN 81 MG CHEWABLE TABLET] Chew 81 mg daily.       calcitonin, salmon, (MIACALCIN) 200 unit/actuation nasal spray [CALCITONIN, SALMON,  (MIACALCIN) 200 UNIT/ACTUATION NASAL SPRAY] Apply 1 spray into alternating nostrils daily. 3.7 mL 0     HYDROcodone-acetaminophen 5-325 mg per tablet [HYDROCODONE-ACETAMINOPHEN 5-325 MG PER TABLET] Take 1 tablet by mouth every 4 (four) hours as needed for pain. 20 tablet 0     naproxen sodium (ALEVE) 220 MG tablet [NAPROXEN SODIUM (ALEVE) 220 MG TABLET] Take 220 mg by mouth 2 (two) times a day with meals.       polyethylene glycol (GLYCOLAX) 17 gram/dose powder [POLYETHYLENE GLYCOL (GLYCOLAX) 17 GRAM/DOSE POWDER] Take 17 g by mouth daily.       senna-docusate (SENNOSIDES-DOCUSATE SODIUM) 8.6-50 mg tablet [SENNA-DOCUSATE (SENNOSIDES-DOCUSATE SODIUM) 8.6-50 MG TABLET] Take 2 tablets by mouth daily.  0     No Known Allergies       Lab Results    Chemistry/lipid CBC Cardiac Enzymes/BNP/TSH/INR   No results for input(s): CHOL, HDL, LDL, TRIG, CHOLHDLRATIO in the last 33957 hours.  No results for input(s): LDL in the last 97033 hours.  Recent Labs   Lab Test 09/27/21  1312      POTASSIUM 4.0   CHLORIDE 106   CO2 24   GLC 98   BUN 20   CR 1.12*   GFRESTIMATED 44*   CINDY 10.3     Recent Labs   Lab Test 09/27/21  1312 03/06/19  1711 08/03/18  1554   CR 1.12* 1.14* 1.06     No results for input(s): A1C in the last 05021 hours.       No results for input(s): WBC, HGB, HCT, MCV, PLT in the last 41584 hours.  No results for input(s): HGB in the last 92011 hours. No results for input(s): TROPONINI in the last 74800 hours.  No results for input(s): BNP, NTBNPI, NTBNP in the last 29817 hours.  Recent Labs   Lab Test 09/27/21  1312   TSH 1.89     No results for input(s): INR in the last 02207 hours.     Thien Levi MD                                          Thank you for allowing me to participate in the care of your patient.      Sincerely,     Thien Levi MD     [facility]  cc:   No referring provider defined for this encounter.

## 2021-10-20 ENCOUNTER — HOSPITAL ENCOUNTER (OUTPATIENT)
Dept: CARDIOLOGY | Facility: HOSPITAL | Age: 86
Discharge: HOME | End: 2021-10-20
Attending: INTERNAL MEDICINE | Admitting: INTERNAL MEDICINE
Payer: COMMERCIAL

## 2021-10-20 DIAGNOSIS — I49.9 IRREGULAR HEART BEAT: Primary | ICD-10-CM

## 2021-10-20 DIAGNOSIS — I10 ESSENTIAL HYPERTENSION: ICD-10-CM

## 2021-10-20 DIAGNOSIS — I49.9 IRREGULAR HEART BEAT: ICD-10-CM

## 2021-10-20 PROCEDURE — 93227 XTRNL ECG REC<48 HR R&I: CPT | Performed by: INTERNAL MEDICINE

## 2021-10-20 PROCEDURE — 93225 XTRNL ECG REC<48 HRS REC: CPT

## 2021-11-01 ENCOUNTER — PATIENT OUTREACH (OUTPATIENT)
Dept: CARE COORDINATION | Facility: CLINIC | Age: 86
End: 2021-11-01

## 2021-11-01 DIAGNOSIS — Z65.9 PSYCHOSOCIAL PROBLEM: Primary | ICD-10-CM

## 2021-11-01 NOTE — PROGRESS NOTES
Clinic Care Coordination Contact  [facility]/Voicemail       Clinical Data: Care Coordinator Outreach  Outreach attempted x 1. Unable to leave message on patient's son's voicemail with call back information and requested return call.  Plan:Care Coordinator will try to reach patient again in 1-2 business days.    Merle Curran [facility]  Clinic Care Coordinator  [facility]   [phone]

## 2021-11-02 ENCOUNTER — TELEPHONE (OUTPATIENT)
Dept: CARDIOLOGY | Facility: CLINIC | Age: 86
End: 2021-11-02

## 2021-11-02 NOTE — TELEPHONE ENCOUNTER
for son Kvng to return call. [facility]     ----- Message from Thien Levi MD sent at 11/1/2021  9:24 AM CDT -----  Paola,    Her monitor showed no clear AF, so no need for anticoagulation. She does have fairly frequent PVC's w/ 19% burden, which may account for her irregular heart beat. While those could be ablated, that would require an invasive procedure, which wouldn't necessarily be ideal given her overall state of health. My suggestion would be to wait for an echo, and if heart function is normal, just start her on lopressor 12.5mg bid and see if it helps with her symptoms. Follow up in 3 mos to check on how she is doing.    Thx!  Thien

## 2021-11-03 DIAGNOSIS — I49.9 IRREGULAR HEART BEAT: Primary | ICD-10-CM

## 2021-11-03 NOTE — TELEPHONE ENCOUNTER
Phone call to son Kvng with results and recommendations. Discussed results in much detail and what to expect for future echocardiogram scheduled for 11/11. Kvng expresses great concern for his mother's cognitive decline and states he is to schedule an appointment with Neurology in the near future. Encouraged Kvng to follow-through with Neurology appointment to address concerns for patient's recent changing mood, cognition, and memory. Patient has been feeling well physically and Kvng reports her mood and cognition ar his main concerns for her. Kvng verbalizes his preference is to avoid any invasive procedures for his mother and is open to medication management. Informed Kvng writer would be in touch once echocardiogram results reviewed and recommendations made. Plan for follow-up in three months with Dr. Levi. Order placed for follow-up in three months. Kvng verbalized understanding, no further questions or concerns at this time. [facility]

## 2021-11-11 ENCOUNTER — HOSPITAL ENCOUNTER (OUTPATIENT)
Dept: CARDIOLOGY | Facility: HOSPITAL | Age: 86
Discharge: HOME | End: 2021-11-11
Attending: INTERNAL MEDICINE | Admitting: INTERNAL MEDICINE
Payer: COMMERCIAL

## 2021-11-11 DIAGNOSIS — I10 ESSENTIAL HYPERTENSION: ICD-10-CM

## 2021-11-11 LAB — LVEF ECHO: NORMAL

## 2021-11-11 PROCEDURE — 93306 TTE W/DOPPLER COMPLETE: CPT

## 2021-11-11 PROCEDURE — 93306 TTE W/DOPPLER COMPLETE: CPT | Mod: 26 | Performed by: INTERNAL MEDICINE

## 2021-11-15 ENCOUNTER — PATIENT OUTREACH (OUTPATIENT)
Dept: CARE COORDINATION | Facility: CLINIC | Age: 86
End: 2021-11-15
Payer: COMMERCIAL

## 2021-11-15 NOTE — PROGRESS NOTES
Clinic Care Coordination Contact  [facility]/Voicemail       Clinical Data: Care Coordinator Outreach  Outreach attempted x 2.  Left message on patient's voicemail with call back information and requested return call.  Plan: Care Coordinator will try to reach patient again in 2 weeks.    Merle Curran \[facility] [place] [facility]\""  Clinic Care Coordinator  [facility]   [phone]        
No

## 2021-12-15 ENCOUNTER — PATIENT OUTREACH (OUTPATIENT)
Dept: CARE COORDINATION | Facility: CLINIC | Age: 86
End: 2021-12-15
Payer: COMMERCIAL

## 2021-12-15 NOTE — PROGRESS NOTES
Clinic Care Coordination Contact  [facility]/Voicemail       Clinical Data: Care Coordinator Outreach  Outreach attempted x 3.  Left message on patient's son's voicemail with call back information and requested return call.  Plan:  Care Coordinator will try to reach patient again in 2 weeks.    Merle Curran [facility]  Clinic Care Coordinator  [facility]   [phone]       Yes

## 2022-01-25 ENCOUNTER — TRANSFERRED RECORDS (OUTPATIENT)
Dept: HEALTH INFORMATION MANAGEMENT | Facility: CLINIC | Age: 87
End: 2022-01-25
Payer: COMMERCIAL

## 2022-02-22 ENCOUNTER — PATIENT OUTREACH (OUTPATIENT)
Dept: CARE COORDINATION | Facility: CLINIC | Age: 87
End: 2022-02-22
Payer: COMMERCIAL

## 2022-02-22 NOTE — PROGRESS NOTES
Clinic Care Coordination Contact  [facility]/Voicemail       Clinical Data: Care Coordinator Outreach  Outreach attempted x 4.  Left message on patient's voicemail with call back information and requested return call.  Plan: Care Coordinator will try to reach patient again in 1 month.    NATHALIA Bravo  Social Work Care Coordinator - [facility]  Care Coordination  [email or web address]  Cell Phone: [phone]  Gender pronouns: she/her  Employed by [employer]

## 2022-02-22 NOTE — LETTER
[facility] CARE COORDINATION  [facility]  [address] [place] [address]  April 21, 2022    Lora Ansari  [address] [address]      Dear Lora,    I am a clinic care coordinator who works with Gorge Lanier PA-C at [facility]. I wanted to introduce myself and provide you with my contact information for you to be able to call me with any questions or concerns. Below is a description of clinic care coordination and how I can further assist you.      The clinic care coordination team is made up of a registered nurse,  and community health worker who understand the health care system. The goal of clinic care coordination is to help you manage your health and improve access to the health care system in the most efficient manner. The team can assist you in meeting your health care goals by providing education, coordinating services, strengthening the communication among your providers and supporting you with any resource needs.    Please feel free to contact me at [phone] with any questions or concerns. We are focused on providing you with the highest-quality healthcare experience possible and that all starts with you.     Sincerely,       NATHALIA Bravo  Social Work Care Coordinator - [facility]  Care Coordination  [email or web address]  [email or web address]  Cell Phone: [phone]  Gender pronouns: she/her  Employed by [facility] [employer]

## 2022-03-11 ENCOUNTER — TRANSFERRED RECORDS (OUTPATIENT)
Dept: HEALTH INFORMATION MANAGEMENT | Facility: CLINIC | Age: 87
End: 2022-03-11
Payer: COMMERCIAL

## 2022-04-06 ENCOUNTER — MEDICAL CORRESPONDENCE (OUTPATIENT)
Dept: HEALTH INFORMATION MANAGEMENT | Facility: CLINIC | Age: 87
End: 2022-04-06
Payer: COMMERCIAL

## 2022-04-21 NOTE — PROGRESS NOTES
Clinic Care Coordination Contact  [facility]/Voicemail       Clinical Data: Care Coordinator Outreach  Outreach attempted x 4. .  Plan: Care Coordinator will send care coordination introduction letter with care coordinator contact information and explanation of care coordination services via mail. Care Coordinator will do no further outreaches at this time.      NATHALIA Matos for  NATHALIA Bravo  Social Work Care Coordinator - [facility]  Care Coordination  [email or web address]  Cell Phone: [phone]  Gender pronouns: she/her  Employed by [employer]

## 2022-04-26 ENCOUNTER — TRANSFERRED RECORDS (OUTPATIENT)
Dept: HEALTH INFORMATION MANAGEMENT | Facility: CLINIC | Age: 87
End: 2022-04-26
Payer: COMMERCIAL

## 2022-04-26 ENCOUNTER — LAB REQUISITION (OUTPATIENT)
Dept: LAB | Facility: CLINIC | Age: 87
End: 2022-04-26

## 2022-04-26 DIAGNOSIS — I10 ESSENTIAL (PRIMARY) HYPERTENSION: ICD-10-CM

## 2022-04-26 LAB
ANION GAP SERPL CALCULATED.3IONS-SCNC: 14 MMOL/L (ref 5–18)
BUN SERPL-MCNC: 24 MG/DL (ref 8–28)
CALCIUM SERPL-MCNC: 10 MG/DL (ref 8.5–10.5)
CHLORIDE BLD-SCNC: 107 MMOL/L (ref 98–107)
CO2 SERPL-SCNC: 23 MMOL/L (ref 22–31)
CREAT SERPL-MCNC: 1.16 MG/DL (ref 0.6–1.1)
CREATININE (EXTERNAL): 1.16 MG/DL (ref 0.6–1.1)
GFR ESTIMATED (EXTERNAL): 45 ML/MIN/1.73M2
GFR SERPL CREATININE-BSD FRML MDRD: 45 ML/MIN/1.73M2
GLUCOSE (EXTERNAL): 93 MG/DL (ref 70–125)
GLUCOSE BLD-MCNC: 93 MG/DL (ref 70–125)
POTASSIUM (EXTERNAL): 4.2 MMOL/L (ref 3.5–5)
POTASSIUM BLD-SCNC: 4.2 MMOL/L (ref 3.5–5)
SODIUM SERPL-SCNC: 144 MMOL/L (ref 136–145)

## 2022-04-26 PROCEDURE — 82310 ASSAY OF CALCIUM: CPT | Performed by: PHYSICIAN ASSISTANT

## 2022-04-28 ENCOUNTER — MEDICAL CORRESPONDENCE (OUTPATIENT)
Dept: HEALTH INFORMATION MANAGEMENT | Facility: CLINIC | Age: 87
End: 2022-04-28
Payer: COMMERCIAL

## 2023-01-03 ENCOUNTER — LAB REQUISITION (OUTPATIENT)
Dept: LAB | Facility: CLINIC | Age: 88
End: 2023-01-03

## 2023-01-03 DIAGNOSIS — R35.0 FREQUENCY OF MICTURITION: ICD-10-CM

## 2023-01-03 DIAGNOSIS — Z13.1 ENCOUNTER FOR SCREENING FOR DIABETES MELLITUS: ICD-10-CM

## 2023-01-03 DIAGNOSIS — Z13.6 ENCOUNTER FOR SCREENING FOR CARDIOVASCULAR DISORDERS: ICD-10-CM

## 2023-01-03 LAB
ANION GAP SERPL CALCULATED.3IONS-SCNC: 14 MMOL/L (ref 7–15)
BUN SERPL-MCNC: 24.9 MG/DL (ref 8–23)
CALCIUM SERPL-MCNC: 9.5 MG/DL (ref 8.8–10.2)
CHLORIDE SERPL-SCNC: 104 MMOL/L (ref 98–107)
CHOLEST SERPL-MCNC: 191 MG/DL
CREAT SERPL-MCNC: 1.25 MG/DL (ref 0.51–0.95)
DEPRECATED HCO3 PLAS-SCNC: 22 MMOL/L (ref 22–29)
GFR SERPL CREATININE-BSD FRML MDRD: 41 ML/MIN/1.73M2
GLUCOSE SERPL-MCNC: 92 MG/DL (ref 70–99)
HDLC SERPL-MCNC: 49 MG/DL
LDLC SERPL CALC-MCNC: 113 MG/DL
NONHDLC SERPL-MCNC: 142 MG/DL
POTASSIUM SERPL-SCNC: 3.9 MMOL/L (ref 3.4–5.3)
SODIUM SERPL-SCNC: 140 MMOL/L (ref 136–145)
TRIGL SERPL-MCNC: 143 MG/DL

## 2023-01-03 PROCEDURE — 80061 LIPID PANEL: CPT | Performed by: FAMILY MEDICINE

## 2023-01-03 PROCEDURE — 87086 URINE CULTURE/COLONY COUNT: CPT | Performed by: FAMILY MEDICINE

## 2023-01-03 PROCEDURE — 80048 BASIC METABOLIC PNL TOTAL CA: CPT | Performed by: FAMILY MEDICINE

## 2023-01-05 LAB — BACTERIA UR CULT: ABNORMAL

## 2023-04-07 ENCOUNTER — LAB REQUISITION (OUTPATIENT)
Dept: LAB | Facility: CLINIC | Age: 88
End: 2023-04-07
Payer: COMMERCIAL

## 2023-04-07 DIAGNOSIS — R82.90 UNSPECIFIED ABNORMAL FINDINGS IN URINE: ICD-10-CM

## 2023-04-07 LAB
ALBUMIN UR-MCNC: 30 MG/DL
APPEARANCE UR: ABNORMAL
BACTERIA #/AREA URNS HPF: ABNORMAL /HPF
BILIRUB UR QL STRIP: NEGATIVE
COLOR UR AUTO: YELLOW
GLUCOSE UR STRIP-MCNC: NEGATIVE MG/DL
HGB UR QL STRIP: ABNORMAL
KETONES UR STRIP-MCNC: NEGATIVE MG/DL
LEUKOCYTE ESTERASE UR QL STRIP: ABNORMAL
MUCOUS THREADS #/AREA URNS LPF: PRESENT /LPF
NITRATE UR QL: POSITIVE
PH UR STRIP: 6 [PH] (ref 5–7)
RBC URINE: 11 /HPF
SP GR UR STRIP: 1.02 (ref 1–1.03)
SQUAMOUS EPITHELIAL: 2 /HPF
TRANSITIONAL EPI: <1 /HPF
UROBILINOGEN UR STRIP-MCNC: NORMAL MG/DL
WBC CLUMPS #/AREA URNS HPF: PRESENT /HPF
WBC URINE: >182 /HPF

## 2023-04-07 PROCEDURE — 87086 URINE CULTURE/COLONY COUNT: CPT | Mod: ORL | Performed by: REGISTERED NURSE

## 2023-04-07 PROCEDURE — 81001 URINALYSIS AUTO W/SCOPE: CPT | Mod: ORL | Performed by: REGISTERED NURSE

## 2023-04-09 LAB — BACTERIA UR CULT: ABNORMAL

## 2024-02-16 ENCOUNTER — LAB REQUISITION (OUTPATIENT)
Dept: LAB | Facility: CLINIC | Age: 89
End: 2024-02-16
Payer: COMMERCIAL

## 2024-02-16 DIAGNOSIS — N18.9 CHRONIC KIDNEY DISEASE, UNSPECIFIED: ICD-10-CM

## 2024-02-19 LAB
ANION GAP SERPL CALCULATED.3IONS-SCNC: 16 MMOL/L (ref 7–15)
BUN SERPL-MCNC: 20.8 MG/DL (ref 8–23)
CALCIUM SERPL-MCNC: 9.6 MG/DL (ref 8.2–9.6)
CHLORIDE SERPL-SCNC: 106 MMOL/L (ref 98–107)
CREAT SERPL-MCNC: 1 MG/DL (ref 0.51–0.95)
DEPRECATED HCO3 PLAS-SCNC: 19 MMOL/L (ref 22–29)
EGFRCR SERPLBLD CKD-EPI 2021: 53 ML/MIN/1.73M2
GLUCOSE SERPL-MCNC: 111 MG/DL (ref 70–99)
POTASSIUM SERPL-SCNC: 4.2 MMOL/L (ref 3.4–5.3)
SODIUM SERPL-SCNC: 141 MMOL/L (ref 135–145)

## 2024-02-19 PROCEDURE — P9604 ONE-WAY ALLOW PRORATED TRIP: HCPCS | Mod: ORL | Performed by: NURSE PRACTITIONER

## 2024-02-19 PROCEDURE — 36415 COLL VENOUS BLD VENIPUNCTURE: CPT | Mod: ORL | Performed by: NURSE PRACTITIONER

## 2024-02-19 PROCEDURE — 80048 BASIC METABOLIC PNL TOTAL CA: CPT | Mod: ORL | Performed by: NURSE PRACTITIONER

## 2024-08-17 ENCOUNTER — APPOINTMENT (OUTPATIENT)
Dept: RADIOLOGY | Facility: HOSPITAL | Age: 89
End: 2024-08-17
Attending: EMERGENCY MEDICINE
Payer: COMMERCIAL

## 2024-08-17 ENCOUNTER — APPOINTMENT (OUTPATIENT)
Dept: CT IMAGING | Facility: HOSPITAL | Age: 89
End: 2024-08-17
Attending: EMERGENCY MEDICINE
Payer: COMMERCIAL

## 2024-08-17 ENCOUNTER — HOSPITAL ENCOUNTER (OUTPATIENT)
Facility: HOSPITAL | Age: 89
Setting detail: OBSERVATION
Discharge: HOME | End: 2024-08-20
Attending: EMERGENCY MEDICINE | Admitting: STUDENT IN AN ORGANIZED HEALTH CARE EDUCATION/TRAINING PROGRAM
Payer: COMMERCIAL

## 2024-08-17 DIAGNOSIS — R26.2 DIFFICULTY WALKING: Primary | ICD-10-CM

## 2024-08-17 DIAGNOSIS — R45.1 AGITATION: ICD-10-CM

## 2024-08-17 DIAGNOSIS — N30.01 ACUTE CYSTITIS WITH HEMATURIA: ICD-10-CM

## 2024-08-17 LAB
ALBUMIN UR-MCNC: NEGATIVE MG/DL
ANION GAP SERPL CALCULATED.3IONS-SCNC: 14 MMOL/L (ref 7–15)
APPEARANCE UR: ABNORMAL
APTT PPP: 30 SECONDS (ref 22–38)
BILIRUB UR QL STRIP: NEGATIVE
BUN SERPL-MCNC: 26.7 MG/DL (ref 8–23)
CALCIUM SERPL-MCNC: 9.8 MG/DL (ref 8.8–10.4)
CHLORIDE SERPL-SCNC: 104 MMOL/L (ref 98–107)
CK SERPL-CCNC: 65 U/L (ref 26–192)
COLOR UR AUTO: ABNORMAL
CREAT SERPL-MCNC: 1.24 MG/DL (ref 0.51–0.95)
CRP SERPL-MCNC: 23.5 MG/L
EGFRCR SERPLBLD CKD-EPI 2021: 41 ML/MIN/1.73M2
ERYTHROCYTE [DISTWIDTH] IN BLOOD BY AUTOMATED COUNT: 12.8 % (ref 10–15)
ERYTHROCYTE [SEDIMENTATION RATE] IN BLOOD BY WESTERGREN METHOD: 20 MM/HR (ref 0–30)
FLUAV RNA SPEC QL NAA+PROBE: NEGATIVE
FLUBV RNA RESP QL NAA+PROBE: NEGATIVE
GLUCOSE SERPL-MCNC: 133 MG/DL (ref 70–99)
GLUCOSE UR STRIP-MCNC: NEGATIVE MG/DL
HCO3 SERPL-SCNC: 24 MMOL/L (ref 22–29)
HCT VFR BLD AUTO: 42.5 % (ref 35–47)
HGB BLD-MCNC: 14 G/DL (ref 11.7–15.7)
HGB UR QL STRIP: NEGATIVE
INR PPP: 1.08 (ref 0.85–1.15)
KETONES UR STRIP-MCNC: ABNORMAL MG/DL
LEUKOCYTE ESTERASE UR QL STRIP: ABNORMAL
MAGNESIUM SERPL-MCNC: 2.3 MG/DL (ref 1.7–2.3)
MCH RBC QN AUTO: 30.8 PG (ref 26.5–33)
MCHC RBC AUTO-ENTMCNC: 32.9 G/DL (ref 31.5–36.5)
MCV RBC AUTO: 94 FL (ref 78–100)
NITRATE UR QL: POSITIVE
PH UR STRIP: 7.5 [PH] (ref 5–7)
PLATELET # BLD AUTO: 220 10E3/UL (ref 150–450)
POTASSIUM SERPL-SCNC: 4.1 MMOL/L (ref 3.4–5.3)
RBC # BLD AUTO: 4.54 10E6/UL (ref 3.8–5.2)
RBC URINE: 1 /HPF
RSV RNA SPEC NAA+PROBE: NEGATIVE
SARS-COV-2 RNA RESP QL NAA+PROBE: NEGATIVE
SODIUM SERPL-SCNC: 142 MMOL/L (ref 135–145)
SP GR UR STRIP: 1.01 (ref 1–1.03)
SQUAMOUS EPITHELIAL: <1 /HPF
TSH SERPL DL<=0.005 MIU/L-ACNC: 1.41 UIU/ML (ref 0.3–4.2)
UROBILINOGEN UR STRIP-MCNC: <2 MG/DL
WBC # BLD AUTO: 5.7 10E3/UL (ref 4–11)
WBC URINE: 4 /HPF

## 2024-08-17 PROCEDURE — 87086 URINE CULTURE/COLONY COUNT: CPT | Performed by: EMERGENCY MEDICINE

## 2024-08-17 PROCEDURE — 71260 CT THORAX DX C+: CPT

## 2024-08-17 PROCEDURE — G0378 HOSPITAL OBSERVATION PER HR: HCPCS

## 2024-08-17 PROCEDURE — 250N000011 HC RX IP 250 OP 636: Performed by: EMERGENCY MEDICINE

## 2024-08-17 PROCEDURE — 99285 EMERGENCY DEPT VISIT HI MDM: CPT | Mod: 25

## 2024-08-17 PROCEDURE — 250N000011 HC RX IP 250 OP 636: Performed by: STUDENT IN AN ORGANIZED HEALTH CARE EDUCATION/TRAINING PROGRAM

## 2024-08-17 PROCEDURE — 82550 ASSAY OF CK (CPK): CPT | Performed by: STUDENT IN AN ORGANIZED HEALTH CARE EDUCATION/TRAINING PROGRAM

## 2024-08-17 PROCEDURE — 36415 COLL VENOUS BLD VENIPUNCTURE: CPT | Performed by: EMERGENCY MEDICINE

## 2024-08-17 PROCEDURE — 72125 CT NECK SPINE W/O DYE: CPT

## 2024-08-17 PROCEDURE — 83735 ASSAY OF MAGNESIUM: CPT | Performed by: EMERGENCY MEDICINE

## 2024-08-17 PROCEDURE — 85027 COMPLETE CBC AUTOMATED: CPT | Performed by: EMERGENCY MEDICINE

## 2024-08-17 PROCEDURE — 93005 ELECTROCARDIOGRAM TRACING: CPT

## 2024-08-17 PROCEDURE — 85652 RBC SED RATE AUTOMATED: CPT | Performed by: STUDENT IN AN ORGANIZED HEALTH CARE EDUCATION/TRAINING PROGRAM

## 2024-08-17 PROCEDURE — 73610 X-RAY EXAM OF ANKLE: CPT | Mod: RT

## 2024-08-17 PROCEDURE — 93005 ELECTROCARDIOGRAM TRACING: CPT | Performed by: EMERGENCY MEDICINE

## 2024-08-17 PROCEDURE — 85610 PROTHROMBIN TIME: CPT | Performed by: EMERGENCY MEDICINE

## 2024-08-17 PROCEDURE — 86140 C-REACTIVE PROTEIN: CPT | Performed by: STUDENT IN AN ORGANIZED HEALTH CARE EDUCATION/TRAINING PROGRAM

## 2024-08-17 PROCEDURE — 99418 PROLNG IP/OBS E/M EA 15 MIN: CPT | Performed by: STUDENT IN AN ORGANIZED HEALTH CARE EDUCATION/TRAINING PROGRAM

## 2024-08-17 PROCEDURE — 99223 1ST HOSP IP/OBS HIGH 75: CPT | Performed by: STUDENT IN AN ORGANIZED HEALTH CARE EDUCATION/TRAINING PROGRAM

## 2024-08-17 PROCEDURE — 84443 ASSAY THYROID STIM HORMONE: CPT | Performed by: EMERGENCY MEDICINE

## 2024-08-17 PROCEDURE — 70450 CT HEAD/BRAIN W/O DYE: CPT

## 2024-08-17 PROCEDURE — 87637 SARSCOV2&INF A&B&RSV AMP PRB: CPT | Performed by: STUDENT IN AN ORGANIZED HEALTH CARE EDUCATION/TRAINING PROGRAM

## 2024-08-17 PROCEDURE — 80048 BASIC METABOLIC PNL TOTAL CA: CPT | Performed by: EMERGENCY MEDICINE

## 2024-08-17 PROCEDURE — 81001 URINALYSIS AUTO W/SCOPE: CPT | Performed by: EMERGENCY MEDICINE

## 2024-08-17 PROCEDURE — 73560 X-RAY EXAM OF KNEE 1 OR 2: CPT | Mod: 50

## 2024-08-17 PROCEDURE — 85730 THROMBOPLASTIN TIME PARTIAL: CPT | Performed by: EMERGENCY MEDICINE

## 2024-08-17 PROCEDURE — 258N000003 HC RX IP 258 OP 636: Performed by: EMERGENCY MEDICINE

## 2024-08-17 PROCEDURE — 250N000013 HC RX MED GY IP 250 OP 250 PS 637: Performed by: STUDENT IN AN ORGANIZED HEALTH CARE EDUCATION/TRAINING PROGRAM

## 2024-08-17 PROCEDURE — 73552 X-RAY EXAM OF FEMUR 2/>: CPT | Mod: RT

## 2024-08-17 RX ORDER — QUETIAPINE FUMARATE 25 MG/1
25 TABLET, FILM COATED ORAL AT BEDTIME
Status: DISCONTINUED | OUTPATIENT
Start: 2024-08-17 | End: 2024-08-20 | Stop reason: HOSPADM

## 2024-08-17 RX ORDER — ONDANSETRON 2 MG/ML
4 INJECTION INTRAMUSCULAR; INTRAVENOUS EVERY 6 HOURS PRN
Status: DISCONTINUED | OUTPATIENT
Start: 2024-08-17 | End: 2024-08-20 | Stop reason: HOSPADM

## 2024-08-17 RX ORDER — LANOLIN ALCOHOL/MO/W.PET/CERES
3 CREAM (GRAM) TOPICAL AT BEDTIME
Status: DISCONTINUED | OUTPATIENT
Start: 2024-08-17 | End: 2024-08-20 | Stop reason: HOSPADM

## 2024-08-17 RX ORDER — LANOLIN ALCOHOL/MO/W.PET/CERES
3 CREAM (GRAM) TOPICAL AT BEDTIME
COMMUNITY

## 2024-08-17 RX ORDER — AMOXICILLIN 250 MG
1 CAPSULE ORAL 2 TIMES DAILY PRN
Status: DISCONTINUED | OUTPATIENT
Start: 2024-08-17 | End: 2024-08-20 | Stop reason: HOSPADM

## 2024-08-17 RX ORDER — IOPAMIDOL 755 MG/ML
70 INJECTION, SOLUTION INTRAVASCULAR ONCE
Status: COMPLETED | OUTPATIENT
Start: 2024-08-17 | End: 2024-08-17

## 2024-08-17 RX ORDER — AMOXICILLIN 250 MG
2 CAPSULE ORAL 2 TIMES DAILY PRN
Status: DISCONTINUED | OUTPATIENT
Start: 2024-08-17 | End: 2024-08-20 | Stop reason: HOSPADM

## 2024-08-17 RX ORDER — POLYETHYLENE GLYCOL 3350 17 G/17G
17 POWDER, FOR SOLUTION ORAL 2 TIMES DAILY PRN
Status: DISCONTINUED | OUTPATIENT
Start: 2024-08-17 | End: 2024-08-20 | Stop reason: HOSPADM

## 2024-08-17 RX ORDER — GABAPENTIN 100 MG/1
100 CAPSULE ORAL 2 TIMES DAILY
Status: DISCONTINUED | OUTPATIENT
Start: 2024-08-17 | End: 2024-08-20 | Stop reason: HOSPADM

## 2024-08-17 RX ORDER — LORAZEPAM 0.5 MG/1
0.5 TABLET ORAL EVERY 6 HOURS PRN
Status: DISCONTINUED | OUTPATIENT
Start: 2024-08-17 | End: 2024-08-20

## 2024-08-17 RX ORDER — DIVALPROEX SODIUM 125 MG/1
2 CAPSULE, COATED PELLETS ORAL AT BEDTIME
COMMUNITY
Start: 2024-08-07

## 2024-08-17 RX ORDER — ACETAMINOPHEN 500 MG
500-1000 TABLET ORAL EVERY 6 HOURS PRN
COMMUNITY

## 2024-08-17 RX ORDER — AMLODIPINE BESYLATE 10 MG/1
10 TABLET ORAL DAILY
COMMUNITY
Start: 2024-08-07

## 2024-08-17 RX ORDER — ONDANSETRON 4 MG/1
4 TABLET, ORALLY DISINTEGRATING ORAL EVERY 6 HOURS PRN
Status: DISCONTINUED | OUTPATIENT
Start: 2024-08-17 | End: 2024-08-20 | Stop reason: HOSPADM

## 2024-08-17 RX ORDER — ACETAMINOPHEN 325 MG/1
975 TABLET ORAL 3 TIMES DAILY PRN
Status: DISCONTINUED | OUTPATIENT
Start: 2024-08-17 | End: 2024-08-20 | Stop reason: HOSPADM

## 2024-08-17 RX ORDER — AMLODIPINE BESYLATE 5 MG/1
10 TABLET ORAL DAILY
Status: DISCONTINUED | OUTPATIENT
Start: 2024-08-18 | End: 2024-08-20 | Stop reason: HOSPADM

## 2024-08-17 RX ORDER — CEFDINIR 300 MG/1
300 CAPSULE ORAL DAILY
Status: DISCONTINUED | OUTPATIENT
Start: 2024-08-18 | End: 2024-08-17

## 2024-08-17 RX ORDER — GABAPENTIN 100 MG/1
100 CAPSULE ORAL 2 TIMES DAILY
COMMUNITY
Start: 2024-08-13

## 2024-08-17 RX ORDER — DIVALPROEX SODIUM 125 MG/1
250 CAPSULE, COATED PELLETS ORAL AT BEDTIME
Status: DISCONTINUED | OUTPATIENT
Start: 2024-08-17 | End: 2024-08-20 | Stop reason: HOSPADM

## 2024-08-17 RX ORDER — LORAZEPAM 0.5 MG/1
0.5 TABLET ORAL EVERY 6 HOURS PRN
Status: ON HOLD | COMMUNITY
Start: 2024-06-21 | End: 2024-08-20

## 2024-08-17 RX ORDER — CEFDINIR 300 MG/1
300 CAPSULE ORAL DAILY
Status: DISCONTINUED | OUTPATIENT
Start: 2024-08-17 | End: 2024-08-20 | Stop reason: HOSPADM

## 2024-08-17 RX ORDER — CEFTRIAXONE 1 G/1
1 INJECTION, POWDER, FOR SOLUTION INTRAMUSCULAR; INTRAVENOUS EVERY 24 HOURS
Status: DISCONTINUED | OUTPATIENT
Start: 2024-08-17 | End: 2024-08-17

## 2024-08-17 RX ADMIN — IOPAMIDOL 70 ML: 755 INJECTION, SOLUTION INTRAVENOUS at 15:09

## 2024-08-17 RX ADMIN — Medication 3 MG: at 22:09

## 2024-08-17 RX ADMIN — QUETIAPINE FUMARATE 25 MG: 25 TABLET ORAL at 22:09

## 2024-08-17 RX ADMIN — DIVALPROEX SODIUM 250 MG: 125 CAPSULE, COATED PELLETS ORAL at 22:09

## 2024-08-17 RX ADMIN — SODIUM CHLORIDE 500 ML: 9 INJECTION, SOLUTION INTRAVENOUS at 15:39

## 2024-08-17 RX ADMIN — GABAPENTIN 100 MG: 100 CAPSULE ORAL at 22:09

## 2024-08-17 RX ADMIN — CEFDINIR 300 MG: 300 CAPSULE ORAL at 22:09

## 2024-08-17 ASSESSMENT — ACTIVITIES OF DAILY LIVING (ADL)
TOILETING_ISSUES: YES
DRESSING/BATHING: BATHING DIFFICULTY, ASSISTANCE 1 PERSON;DRESSING DIFFICULTY, ASSISTANCE 1 PERSON
DIFFICULTY_COMMUNICATING: YES
DIFFICULTY_EATING/SWALLOWING: NO
ADLS_ACUITY_SCORE: 35
TOILETING: 0-->INDEPENDENT
CHANGE_IN_FUNCTIONAL_STATUS_SINCE_ONSET_OF_CURRENT_ILLNESS/INJURY: YES
ADLS_ACUITY_SCORE: 35
ADLS_ACUITY_SCORE: 35
THE_FOLLOWING_AIDS_WERE_PROVIDED;: PATIENT DECLINED OFFER OF AUXILIARY AIDS
ADLS_ACUITY_SCORE: 44
FALL_HISTORY_WITHIN_LAST_SIX_MONTHS: YES
ADLS_ACUITY_SCORE: 35
TOILETING_ASSISTANCE: TOILETING DIFFICULTY, ASSISTANCE 1 PERSON
DESCRIBE_HEARING_LOSS: BILATERAL HEARING LOSS
WEAR_GLASSES_OR_BLIND: NO
COMMUNICATION: DIFFICULTY UNDERSTANDING
WALKING_OR_CLIMBING_STAIRS_DIFFICULTY: YES
WALKING_OR_CLIMBING_STAIRS: AMBULATION DIFFICULTY, DEPENDENT;STAIR CLIMBING DIFFICULTY, DEPENDENT;TRANSFERRING DIFFICULTY, DEPENDENT
ADLS_ACUITY_SCORE: 43
ADLS_ACUITY_SCORE: 35
WERE_AUXILIARY_AIDS_OFFERED?: YES
TOILETING: 0-->INDEPENDENT
CONCENTRATING,_REMEMBERING_OR_MAKING_DECISIONS_DIFFICULTY: YES
DRESSING/BATHING_DIFFICULTY: YES
ADLS_ACUITY_SCORE: 35
DOING_ERRANDS_INDEPENDENTLY_DIFFICULTY: YES
ADLS_ACUITY_SCORE: 44
HEARING_DIFFICULTY_OR_DEAF: YES
NUMBER_OF_TIMES_PATIENT_HAS_FALLEN_WITHIN_LAST_SIX_MONTHS: 1
ADLS_ACUITY_SCORE: 35

## 2024-08-17 ASSESSMENT — COLUMBIA-SUICIDE SEVERITY RATING SCALE - C-SSRS
1. IN THE PAST MONTH, HAVE YOU WISHED YOU WERE DEAD OR WISHED YOU COULD GO TO SLEEP AND NOT WAKE UP?: NO
6. HAVE YOU EVER DONE ANYTHING, STARTED TO DO ANYTHING, OR PREPARED TO DO ANYTHING TO END YOUR LIFE?: NO
2. HAVE YOU ACTUALLY HAD ANY THOUGHTS OF KILLING YOURSELF IN THE PAST MONTH?: NO

## 2024-08-17 NOTE — ED TRIAGE NOTES
Patient presents here with her daughters for evaluation of change in her ability to walk, noting that her left leg seemed to be weak about four days ago. This morning, she had an unwitnessed fall and found on the floor by nursing staff. The patient is confused and lives in memory care.      Triage Assessment (Adult)       Row Name 08/17/24 1318          Triage Assessment    Airway WDL WDL        Respiratory WDL    Respiratory WDL WDL        Skin Circulation/Temperature WDL    Skin Circulation/Temperature WDL WDL        Cardiac WDL    Cardiac WDL WDL        Peripheral/Neurovascular WDL    Peripheral Neurovascular WDL WDL        Cognitive/Neuro/Behavioral WDL    Cognitive/Neuro/Behavioral WDL WDL

## 2024-08-17 NOTE — MEDICATION SCRIBE - ADMISSION MEDICATION HISTORY
Medication Scribe Admission Medication History    Admission medication history is complete. The information provided in this note is only as accurate as the sources available at the time of the update.    Information Source(s): Facility (U/NH/) medication list/MAR and CareEverywhere/SureScripts via phone    Pertinent Information:     Changes made to PTA medication list:  Added: None  Deleted: None  Changed: None    Allergies reviewed with patient and updates made in EHR: yes    Medication History Completed By: WES MCCANN 8/17/2024 6:31 PM    PTA Med List   Medication Sig Last Dose    acetaminophen (TYLENOL) 500 MG tablet Take 500-1,000 mg by mouth every 6 hours as needed for mild pain 8/17/2024 at am    amLODIPine (NORVASC) 10 MG tablet Take 10 mg by mouth daily 8/17/2024 at am    divalproex sodium delayed-release (DEPAKOTE SPRINKLE) 125 MG DR capsule Take 2 capsules by mouth at bedtime 8/16/2024 at pm    gabapentin (NEURONTIN) 100 MG capsule Take 100 mg by mouth 2 times daily 8/17/2024 at am    LORazepam (ATIVAN) 0.5 MG tablet Take 0.5 mg by mouth every 6 hours as needed 8/16/2024 at pm    melatonin 3 MG tablet Take 3 tablets by mouth at bedtime 8/16/2024 at pm

## 2024-08-17 NOTE — ED PROVIDER NOTES
Emergency Department Encounter     Evaluation Date & Time:   2024  1:25 PM    CHIEF COMPLAINT:  Extremity Weakness      Triage Note:Patient presents here with her daughters for evaluation of change in her ability to walk, noting that her left leg seemed to be weak about four days ago. This morning, she had an unwitnessed fall and found on the floor by nursing staff. The patient is confused and lives in memory care.      Triage Assessment (Adult)       Row Name [id number]          Triage Assessment    Airway WDL WDL        Respiratory WDL    Respiratory WDL WDL        Skin Circulation/Temperature WDL    Skin Circulation/Temperature WDL WDL        Cardiac WDL    Cardiac WDL WDL        Peripheral/Neurovascular WDL    Peripheral Neurovascular WDL WDL        Cognitive/Neuro/Behavioral WDL    Cognitive/Neuro/Behavioral WDL WDL                     Impression and Plan       FINAL IMPRESSION:    ICD-10-CM    1. Difficulty walking  R26.2             ED COURSE & MEDICAL DECISION MAKIN:44 PM I met with the patient, obtained history, performed an initial exam, and discussed options and plan for diagnostics and treatment here in the ED.   2:46 PM I decided that the patient will likely need to be admitted.      89 year old female, history of HTN, seizures and dementia, who presents from her memory care facility for evaluation of difficulty walking x 4 days.     Due to dementia, patient is unable to provide helpful history.     Daughter reports that the patient is usually ambulatory, but has been bracing herself using the furniture to get around. Staff reported that it seemed that her difficulty walking was due to her LLE, but daughter reports that it seems more like she is not wanting to use her RLE.     The patient also had an unwitnessed fall today - staff found her on the floor in her room. Daughter reports that patient is usually able to get herself up after a fall, but was unable to today.     On exam, there  are no signs of trauma with non-focal neuro exam. She initially seemed to have tenderness to palpation of the right femur and on reassessment, it seemed that she had tenderness to palpation of the left femur.    IV access established and blood sent for labs.    Given unwitnessed fall, head CT performed and negative for acute intracranial process.    Precautionary cervical spine CT performed and there are age-indeterminate mild compression fracture deformities of the superior aspects of the C7, T1 and T2 vertebral bodies. Recent fractures cannot be excluded for which cervical spine MRI was recommended to evaluate for edema that would signify recent fracture. Discussed with the patient's daughter who advised that patient would not tolerate MRI imaging. She is moving her head / neck without any distress and risks of MRI imaging felt to outweigh benefit.     CT chest / abdomen / pelvis with IV contrast also performed and demonstrated:  1. No acute traumatic visceral, vascular, or osseous injury of the chest, abdomen, pelvis.  2.  5.6 x 5.7 cm infrarenal abdominal aortic aneurysm.  3.  Moderately enlarged heart with CAD and atherosclerotic vascular disease.  4.  Diverticulosis. No diverticulitis, colitis, or obstruction.     X-rays right femur negative for fracture.    Given that patient still will not walk, x-rays right knee / ankle and left femur / knee / ankle also ordered and decision made to admit patient for further evaluation and monitoring. Patient admitted to Hospitalist Service. Patient stable throughout ED course.    Patient is DNR, per patient's daughter, and would not want any heroic procedures.      At the conclusion of the encounter I discussed the results of all the tests and the disposition. The questions were answered. The family acknowledged understanding and were agreeable with the care plan.    Medical Decision Making    History:  Obtained supplemental history:Supplemental history obtained?:  Documented in chart and Family Member/Significant Other  Reviewed external records: External records reviewed?: No    External consultation:  Did you consider but not order tests?: Work up considered but not performed and documented in chart, if applicable  Did you interpret images independently?: Independent interpretation of ECG and images noted in documentation, when applicable.  Consultation discussion with other provider:Did you involve another provider (consultant, , pharmacy, etc.)?: I discussed the care with another health care provider, see documentation for details.    Complicating factors:  Care impacted by chronic illness:Hypertension and Other: seizures, dementia  Care significantly affected by social determinants of health:N/A    Disposition considerations: Admit.    MEDICATIONS GIVEN IN THE EMERGENCY DEPARTMENT:  Medications   sodium chloride 0.9% BOLUS 500 mL (500 mLs Intravenous $New Bag 8/17/24 1539)   iopamidol (ISOVUE-370) solution 70 mL (70 mLs Intravenous $Given 8/17/24 1509)       NEW PRESCRIPTIONS STARTED AT TODAY'S ED VISIT:  New Prescriptions    No medications on file       HPI     HPI     Lora Ansari is a 89 year old female, history of HTN, seizures and dementia, who presents to this ED by EMS for evaluation of extremity weakness.    Due to dementia, unable to obtain useful history from the patient.     Per patient's daughter, the patient is usually ambulatory, but has been unable to walk normally since Tuesday (4 days). Per staff at her memory care facility, the patient started using furniture to brace herself when walking from the dining nuno to her room. The staff had reported that it seemed that she was not using her LLE, but daughter reports that patient's RLE seems to have pain. She had pelvic and low back x-rays yesterday, which were negative.     Daughter also reports that patient had an unwitnessed fall today - memory care staff found her on the floor in her room today.  "Daughter reports that patient is usually able to get herself up after a fall, but was unable today.     Per patient, she denies pain and reports that nothing is wrong. She was unable to bend either of her knees when asked to.       Medical History     No past medical history on file.    Past Surgical History:   Procedure Laterality Date    OOPHORECTOMY      2014    TONSILLECTOMY  1941       Family History   Problem Relation Age of Onset    Cerebrovascular Disease Mother     Coronary Artery Disease Father     Cancer Sister        Social History     Tobacco Use    Smoking status: Never    Smokeless tobacco: Never   Substance Use Topics    Alcohol use: No       acetaminophen (TYLENOL) 325 MG tablet  amLODIPine (NORVASC) 5 MG tablet  aspirin 81 mg chewable tablet  calcitonin, salmon, (MIACALCIN) 200 unit/actuation nasal spray  HYDROcodone-acetaminophen 5-325 mg per tablet  naproxen sodium (ALEVE) 220 MG tablet  polyethylene glycol (GLYCOLAX) 17 gram/dose powder  senna-docusate (SENNOSIDES-DOCUSATE SODIUM) 8.6-50 mg tablet        Physical Exam     First Vitals:  Patient Vitals for the past 24 hrs:   BP Temp Temp src Pulse Resp SpO2 Height Weight   08/17/24 1316 107/79 98.4  F (36.9  C) Oral 105 20 94 % 1.549 m (5' 1\") 59.9 kg (132 lb)       PHYSICAL EXAM:   Physical Exam    GENERAL: Awake, alert.  In no acute distress.   HEENT: Normocephalic, atraumatic.   NECK: No apparent midline tenderness to palpation of cervical spine. No stridor.  PULMONARY: Chest is atraumatic and non-tender to palpation. Symmetrical breath sounds without distress.  Lungs clear to auscultation bilaterally without wheezes, rhonchi or rales.  CARDIO: Regular rate and rhythm.  No significant murmur, rub or gallop.  Radial pulses strong and symmetrical.  ABDOMINAL: Abdomen atraumatic, soft, non-distended and non-tender to palpation.  No apparent CVAT, BL.  BACK:  Back is atraumatic. No apparent midline tenderness to palpation of thoracic and lumbar " spines.   EXTREMITIES: Legs are atraumatic, however she has tenderness to palpation of BL femurs. She will reluctantly bend knees to command. Bipedal edema.      NEURO: Alert and oriented to person.  Cranial nerves grossly intact.  No focal motor deficit.  PSYCH: Normal mood and affect.      Results     LAB:  All pertinent labs reviewed and interpreted  Labs Ordered and Resulted from Time of ED Arrival to Time of ED Departure   BASIC METABOLIC PANEL - Abnormal       Result Value    Sodium 142      Potassium 4.1      Chloride 104      Carbon Dioxide (CO2) 24      Anion Gap 14      Urea Nitrogen 26.7 (*)     Creatinine 1.24 (*)     GFR Estimate 41 (*)     Calcium 9.8      Glucose 133 (*)    CBC WITH PLATELETS - Normal    WBC Count 5.7      RBC Count 4.54      Hemoglobin 14.0      Hematocrit 42.5      MCV 94      MCH 30.8      MCHC 32.9      RDW 12.8      Platelet Count 220     INR - Normal    INR 1.08     PARTIAL THROMBOPLASTIN TIME - Normal    aPTT 30     TSH WITH FREE T4 REFLEX - Normal    TSH 1.41     MAGNESIUM - Normal    Magnesium 2.3     ROUTINE UA WITH MICROSCOPIC REFLEX TO CULTURE       RADIOLOGY:  XR Femur Right 2 Views   Final Result   IMPRESSION: The right hip is negative for fracture. The right hemipelvis is negative for fracture. Diffuse demineralization. Contrast material within the urinary bladder.      CT Chest/Abdomen/Pelvis w Contrast   Final Result   IMPRESSION:   1.  No acute traumatic visceral, vascular, or osseous injury of the chest, abdomen, and pelvis.   2.  5.6 x 5.7 cm infrarenal abdominal aortic aneurysm.   3.  Moderately enlarged heart with coronary artery disease and atherosclerotic vascular disease.   4.  Diverticulosis. No diverticulitis, colitis, or obstruction.      CT Cervical Spine w/o Contrast   Final Result   IMPRESSION: There are age-indeterminate mild compression fracture deformities of the superior aspects of the C7, T1 and T2 vertebral bodies. Recent fractures cannot be  excluded. Cervical spine MRI recommended to evaluate for edema that would signify    recent fracture. Vertebral body heights and contours otherwise normal. Minimal degenerative anterolisthesis of C5 upon C6 and C6 upon C7. Alignment otherwise normal. No spinal canal stenosis. No prevertebral soft tissue swelling.         Head CT w/o contrast   Final Result   IMPRESSION:   1.  No CT evidence for acute intracranial process.   2.  Brain atrophy and presumed chronic microvascular ischemic changes as above.      XR Femur Left 2 Views    (Results Pending)   XR Knee Right 1/2 Views    (Results Pending)   XR Knee Left 1/2 Views    (Results Pending)   Ankle XR, G/E 3 views, right    (Results Pending)   XR Ankle Left G/E 3 Views    (Results Pending)       EC2024, 14:13; NSR with rate of 98 bpm; premature supraventricular complexes; LVH with QRS widening; inferior and anterolateral Q waves with no ST-T wave elevation consistent with ACS or pericarditis; compared to previous EKG dated 2015, PVCs / bigeminy no longer present, lateral Q waves new    EKG independently reviewed and interpreted by Shalini Leslie MD      I, Don Cooper, am serving as a scribe to document services personally performed by Shalini Leslie MD based on my observation and the provider's statements to me. I, Shalini Leslie MD attest that Don Cooper is acting in a scribe capacity, has observed my performance of the services and has documented them in accordance with my direction.    Shalini Leslie MD  Emergency Medicine  [facility] EMERGENCY DEPARTMENT          Shalini Leslie MD  [phone]

## 2024-08-17 NOTE — ED NOTES
"[facility] ED Handoff Report    ED Chief Complaint: fall with extremity weakness    ED Diagnosis:  (R26.2) Difficulty walking    PMH:  No past medical history on file.     Code Status:  No Order     Falls Risk: Yes Band: Applied    Current Living Situation/Residence: lives in an extended care facility     Elimination Status: Continent: Yes, wears a brief     Activity Level: 2 assist    Patients Preferred Language:  English     Needed: No    Vital Signs:  /79   Pulse 105   Temp 98.4  F (36.9  C) (Oral)   Resp 20   Ht 1.549 m (5' 1\")   Wt 59.9 kg (132 lb)   SpO2 94%   BMI 24.94 kg/m       Cardiac Rhythm: SR    Pain Score: Patient is unable to quantify.    Is the Patient Confused:  Yes    Last Food or Drink: 08/17/24 at 1700    Focused Assessment:  Patient presents here with her daughter and granddaughter for evaluation of change in her ability to walk, noting that her left leg seemed to be weak about four days ago. This morning, she had an unwitnessed fall and found on the floor by nursing staff. The patient is confused (at baseline) and lives in memory care. Has been intermittently complaining of bilateral thigh pain, and complains of lower back pain when going from laying to sitting.     Tests Performed: Done: Labs and Imaging    Treatments Provided:  IV fluids    Family Dynamics/Concerns: No    Family Updated On Visitor Policy: Yes    Plan of Care Communicated to Family: Yes    Who Was Updated about Plan of Care: daughter and granddaughter    Belongings Checklist Done and Signed by Patient: Yes    Belongings Sent with Patient: clothing and wheelchair sent with patient    Medications sent with patient: none    Covid: asymptomatic , not tested    RN: Barbara Murillo RN 8/17/2024 5:23 PM        "

## 2024-08-18 ENCOUNTER — APPOINTMENT (OUTPATIENT)
Dept: PHYSICAL THERAPY | Facility: HOSPITAL | Age: 89
End: 2024-08-18
Attending: STUDENT IN AN ORGANIZED HEALTH CARE EDUCATION/TRAINING PROGRAM
Payer: COMMERCIAL

## 2024-08-18 PROCEDURE — G0378 HOSPITAL OBSERVATION PER HR: HCPCS

## 2024-08-18 PROCEDURE — 97162 PT EVAL MOD COMPLEX 30 MIN: CPT | Mod: GP

## 2024-08-18 PROCEDURE — 250N000013 HC RX MED GY IP 250 OP 250 PS 637: Performed by: STUDENT IN AN ORGANIZED HEALTH CARE EDUCATION/TRAINING PROGRAM

## 2024-08-18 PROCEDURE — 99232 SBSQ HOSP IP/OBS MODERATE 35: CPT | Performed by: INTERNAL MEDICINE

## 2024-08-18 PROCEDURE — 97530 THERAPEUTIC ACTIVITIES: CPT | Mod: GP

## 2024-08-18 RX ORDER — OLANZAPINE 10 MG/2ML
2.5 INJECTION, POWDER, FOR SOLUTION INTRAMUSCULAR DAILY PRN
Status: DISCONTINUED | OUTPATIENT
Start: 2024-08-18 | End: 2024-08-20 | Stop reason: HOSPADM

## 2024-08-18 RX ADMIN — DIVALPROEX SODIUM 250 MG: 125 CAPSULE, COATED PELLETS ORAL at 22:52

## 2024-08-18 RX ADMIN — LORAZEPAM 0.5 MG: 0.5 TABLET ORAL at 01:06

## 2024-08-18 RX ADMIN — GABAPENTIN 100 MG: 100 CAPSULE ORAL at 22:53

## 2024-08-18 RX ADMIN — AMLODIPINE BESYLATE 10 MG: 5 TABLET ORAL at 08:18

## 2024-08-18 RX ADMIN — Medication 3 MG: at 22:53

## 2024-08-18 RX ADMIN — ACETAMINOPHEN 975 MG: 325 TABLET ORAL at 01:06

## 2024-08-18 RX ADMIN — GABAPENTIN 100 MG: 100 CAPSULE ORAL at 08:17

## 2024-08-18 RX ADMIN — QUETIAPINE FUMARATE 25 MG: 25 TABLET ORAL at 22:53

## 2024-08-18 RX ADMIN — CEFDINIR 300 MG: 300 CAPSULE ORAL at 08:18

## 2024-08-18 ASSESSMENT — ACTIVITIES OF DAILY LIVING (ADL)
ADLS_ACUITY_SCORE: 51
ADLS_ACUITY_SCORE: 54
ADLS_ACUITY_SCORE: 51
ADLS_ACUITY_SCORE: 54
ADLS_ACUITY_SCORE: 54
DEPENDENT_IADLS:: CLEANING;COOKING;LAUNDRY;SHOPPING;MEAL PREPARATION;MEDICATION MANAGEMENT;MONEY MANAGEMENT;TRANSPORTATION
ADLS_ACUITY_SCORE: 54
ADLS_ACUITY_SCORE: 55
ADLS_ACUITY_SCORE: 54
ADLS_ACUITY_SCORE: 51
ADLS_ACUITY_SCORE: 51
ADLS_ACUITY_SCORE: 54
ADLS_ACUITY_SCORE: 54
ADLS_ACUITY_SCORE: 51
ADLS_ACUITY_SCORE: 50
ADLS_ACUITY_SCORE: 54

## 2024-08-18 NOTE — PLAN OF CARE
Problem: Adult Inpatient Plan of Care  Goal: Absence of Hospital-Acquired Illness or Injury  Intervention: Identify and Manage Fall Risk  Intervention: Prevent Skin Injury   Goal Outcome Evaluation:       Patient alert to self only. Transferred with the assist of (1) using a walker. Gait is unsteady. Requires verbal directions and cues with mobility. PT/OT scheduled. Ate 50% of her breakfast, fed by staff. Incontinent large amounts of urine this morning. Currently up in chair sleeping.       Claudia Osman RN

## 2024-08-18 NOTE — PLAN OF CARE
Goal Outcome Evaluation:PRIMARY DIAGNOSIS: GENERALIZED WEAKNESS    OUTPATIENT/OBSERVATION GOALS TO BE MET BEFORE DISCHARGE  1. Orthostatic performed: No    2. Tolerating PO medications: Yes    3. Return to near baseline physical activity: No    4. Cleared for discharge by consultants (if involved): No    Discharge Planner Nurse   Safe discharge environment identified: Yes  Barriers to discharge: Yes       Entered by: Kristina Mooney RN 08/17/2024 11:04 PM     Please review provider order for any additional goals.   Nurse to notify provider when observation goals have been met and patient is ready for discharge.         Pt ambulated to bathroom one time with staff. She had one time incontinent episode. She is receiving antibiotics for UTI. Pt to be evaluated by PT in the morning. Pt did not complain of pain to legs, but it is difficult for pt to answer questions.

## 2024-08-18 NOTE — PROGRESS NOTES
Care Management Follow Up    Length of Stay (days): 0    Expected Discharge Date: 08/20/2024    Anticipated Discharge Plan:       Transportation: TBD    PT Recommendations: Transitional Care Facility  OT Recommendations:        Barriers to Discharge: medical stability, placement    Prior Living Situation: other (see comments) (Memory Care) with facility resident     Patient/Spokesperson Updated: Yes. Who? Daughter, Dorothy    Additional Information:  SW spoke with daughter Dorothy about TCU recommendation. Daughter provided consent to [facility] and will look at TCU list and provide additional options tomorrow.     Luz Elena Culp LGSW

## 2024-08-18 NOTE — CONSULTS
Care Management Initial Consult    General Information  Assessment completed with:  ,  Pt's daughter Dorothy due to pt's dementia        Primary Care Provider verified and updated as needed:     Readmission within the last 30 days:           Advance Care Planning:            Communication Assessment  Patient's communication style: spoken language (English or Bilingual)    Hearing Difficulty or Deaf: yes   Wear Glasses or Blind: no    Cognitive  Cognitive/Neuro/Behavioral: orientation  Level of Consciousness: confused, alert     Orientation: place, time, situation  Mood/Behavior: cooperative  Best Language: 1 - Mild to moderate  Speech: illogical    Living Environment:   People in home:       Current living Arrangements:        Able to return to prior arrangements:         Family/Social Support:  Care provided by: child(leena)  Provides care for:                  Description of Support System:           Current Resources:   Patient receiving home care services:  not at this time, outpatient provider recently recommended home care for PT/OT. Daughter expressed they haven't started yet.      Community Resources:    Equipment currently used at home: none  Supplies currently used at home:  none    Does the patient's insurance plan have a 3 day qualifying hospital stay waiver?  No    Lifestyle & Psychosocial Needs:  Social Determinants of Health     Food Insecurity: Not on file   Depression: Not on file   Housing Stability: Not on file   Tobacco Use: Low Risk  (8/17/2024)    Patient History     Smoking Tobacco Use: Never     Smokeless Tobacco Use: Never     Passive Exposure: Not on file   Financial Resource Strain: Not on file   Alcohol Use: Not on file   Transportation Needs: Not on file   Physical Activity: Not on file   Interpersonal Safety: Not on file   Stress: Not on file   Social Connections: Not on file   Health Literacy: Not on file       Functional Status:  Prior to admission patient needed assistance: Pt receives  verbal cues to assist with ADL's, facility provided care for all IADL's.    Mental Health Status: Pt has dementia       Values/Beliefs:  Spiritual, Cultural Beliefs, Restoration Practices, Values that affect care:    Description of Beliefs that Will Affect Care: Sofia            Additional Information:  SW completed assessment with pt's daughter Dorothy, due to pt's confusion. Pt lives in memory care at [facility] in [place], [place]. Pt's daughter expressed that pt is pretty independent, however, is supposed to have regular supervision for toileting and gets verbal cues for dressing and eating. The facility provides support for all IADL's. Dorothy expressed that pt's primary care dr. Yunior Ca recommended in home PT and OT last week, however, it had not started yet. JUAN RAMON reviewed P[facility] with daughter and received verbal consent. Daughter will provide transportation at discharge. CM to follow for discharge recommendations.     Luz Elena Culp, LOUISSW

## 2024-08-18 NOTE — PLAN OF CARE
PRIMARY DIAGNOSIS: GENERALIZED WEAKNESS    OUTPATIENT/OBSERVATION GOALS TO BE MET BEFORE DISCHARGE  1. Orthostatic performed: N/A    2. Tolerating PO medications: Yes    3. Return to near baseline physical activity: No    4. Cleared for discharge by consultants (if involved): No    Discharge Planner Nurse   Safe discharge environment identified: Yes  Barriers to discharge: Yes       Entered by: Jem Valladares RN 08/18/2024 1:46 AM     Please review provider order for any additional goals.   Nurse to notify provider when observation goals have been met and patient is ready for discharge.Goal Outcome Evaluation:       Incontinence cares done. Prn tylenol given for pain. Prn ativan given for anxiety. Will continue to monitor.

## 2024-08-18 NOTE — PLAN OF CARE
PRIMARY DIAGNOSIS: GENERALIZED WEAKNESS    OUTPATIENT/OBSERVATION GOALS TO BE MET BEFORE DISCHARGE  1. Orthostatic performed: N/A    2. Tolerating PO medications: Yes    3. Return to near baseline physical activity: No    4. Cleared for discharge by consultants (if involved): No    Discharge Planner Nurse   Safe discharge environment identified: Yes  Barriers to discharge: Yes       Entered by: Jem Valladares RN 08/18/2024 6:36 AM     Please review provider order for any additional goals.   Nurse to notify provider when observation goals have been met and patient is ready for discharge.Goal Outcome Evaluation:

## 2024-08-18 NOTE — PROGRESS NOTES
08/18/24 0937   Appointment Info   Signing Clinician's Name / Credentials (PT) Americo Loza, PT, DPT   Rehab Comments (PT) Patient left lying supine with HOB elevated slightly, bed alarm on, needs within reach, RN aware.   Quick Adds   Quick Adds Certification   Living Environment   Living Environment Comments Per chart review, patient is a memory care resident.   Self-Care   Equipment Currently Used at Home none   Activity/Exercise/Self-Care Comment Per chart review, patient is typically indep with mobility without AD but has been more fatigued and with poor balance the last 5 days or so. Pt receives verbal cues to assist with bADL (supervision for toileting), facility provided care for all IADL's.   General Information   Onset of Illness/Injury or Date of Surgery 08/17/24   Referring Physician Nicolas Lau MD   Pertinent History of Current Problem (include personal factors and/or comorbidities that impact the POC) Difficulty with ambulation, UTI; PMH includes: Advanced dementia   Existing Precautions/Restrictions fall   Cognition   Follows Commands (Cognition) follows one-step commands;0-24% accuracy  (primarily due to lethargy)   Range of Motion (ROM)   Range of Motion ROM deficits secondary to weakness   Strength (Manual Muscle Testing)   Strength (Manual Muscle Testing) Deficits observed during functional mobility   Bed Mobility   Bed Mobility sit-supine   Sit-Supine [place] (Bed Mobility) dependent (less than 25% patient effort);verbal cues;nonverbal cues (demo/gesture)   Transfers   Transfers sit-stand transfer   Sit-Stand Transfer   Sit-Stand [place] (Transfers) maximum assist (25% patient effort);verbal cues;nonverbal cues (demo/gesture)   Comment, (Sit-Stand Transfer) removed FWW and performed with gait belt and HHA   Gait/Stairs (Locomotion)   Comment, (Gait/Stairs) not appropriate to assess due to lethargy   Clinical Impression   Criteria for Skilled Therapeutic Intervention Yes,  treatment indicated   PT Diagnosis (PT) impaired functional mobility   Influenced by the following impairments impaired cognition, lethargy, impaired balance, impaired strength   Functional limitations due to impairments impaired bed mobility, impaired transfers, impaired gait   Clinical Presentation (PT Evaluation Complexity) evolving   Clinical Presentation Rationale clinical judgement   Clinical Decision Making (Complexity) moderate complexity   Planned Therapy Interventions (PT) balance training;bed mobility training;gait training;home exercise program;neuromuscular re-education;patient/family education;stair training;strengthening;transfer training;progressive activity/exercise   Risk & Benefits of therapy have been explained   (patient unable to participate in discussion)   PT Total Evaluation Time   PT Eval, Moderate Complexity Minutes (20640) 8   Therapy Certification   Start of care date 08/18/24   Certification date from 08/18/24   Certification date to 08/25/24   Medical Diagnosis Difficulty with ambulation, UTI   Physical Therapy Goals   PT Frequency 5x/week   PT Predicted Duration/Target Date for Goal Attainment 08/25/24   PT Goals Bed Mobility;Transfers;Gait   PT: Bed Mobility Supervision/stand-by assist;Supine to/from sit   PT: Transfers Supervision/stand-by assist;Sit to/from stand;Bed to/from chair;Assistive device   PT: Gait Supervision/stand-by assist;Rolling walker;50 feet  (FWW)   Interventions   Interventions Quick Adds Therapeutic Activity   Therapeutic Activity   Therapeutic Activities: dynamic activities to improve functional performance Minutes (53372) 8   Symptoms Noted During/After Treatment   (lethargy)   Treatment Detail/Skilled Intervention Patient required repeated cues due to decreased attention and lethargy. Patient required assist for repositioning in the reclienr as she was heavily leaning to her left and in poor position while sleeping in the recliner. Patient reporting she  wanted to use the bathroom but with multiple cues and assist for anterior weight shift in sitting, patient repeatedly returned to sleeping or near sleeping with poor positioning in the recliner. Moved recliner next to bed and assisted patient with dependent pivot transfer from recliner>bed to allow patient to continue sleeping in improved positioning. Patient assisted more with initial sit<>stand on eval compared to pivot transfer back to bed. DepAx2 for scoot to HOB. Patient asleep as soon as PT assisted her down into supine. Discussed with RN after session.   PT Discharge Planning   PT Plan bed mob, transfers/short distance gait with or without FWW (no AD at baseline), balance training   PT Discharge Recommendation (DC Rec) Transitional Care Facility   PT Rationale for DC Rec Patient is currently not able to consistently participate in mobility due to lethargy. RN reports patient received medication at 1am that may still be negatively affecting her. Patient is not currently safe to return to memory care as she is not able to functionally mobilize and is requiring dependent assist at times. As patient's medical status (UTI) continues to improve, anticipate patient's functional status will improve as well and patient may be able to return to memory care without requiring TCU first.   PT Brief overview of current status depA sit<>stand, depA recliner>bed pivot transfer, depA sit>supine   Total Session Time   Timed Code Treatment Minutes 8   Total Session Time (sum of timed and untimed services) [address] [place] [address]  OUTPATIENT PHYSICAL THERAPY EVALUATION  PLAN OF TREATMENT FOR OUTPATIENT REHABILITATION  (COMPLETE FOR INITIAL CLAIMS ONLY)  Patient's Last Name, First Name, M.I.  YOB: 1933  Lora Ansari                        Provider's Name  [facility] Medical Record No.  [id number]                             Onset Date:  08/17/24    Start of Care Date:  08/18/24   Type:     _X_PT   ___OT   ___SLP Medical Diagnosis:  Difficulty with ambulation, UTI              PT Diagnosis:  impaired functional mobility Visits from SOC:  1     See note for plan of treatment, functional goals and certification details    I CERTIFY THE NEED FOR THESE SERVICES FURNISHED UNDER        THIS PLAN OF TREATMENT AND WHILE UNDER MY CARE     (Physician co-signature of this document indicates review and certification of the therapy plan).

## 2024-08-18 NOTE — PLAN OF CARE
PRIMARY DIAGNOSIS: GENERALIZED WEAKNESS    OUTPATIENT/OBSERVATION GOALS TO BE MET BEFORE DISCHARGE  1. Orthostatic performed: N/A    2. Tolerating PO medications: Yes    3. Return to near baseline physical activity: No    4. Cleared for discharge by consultants (if involved): No    Discharge Planner Nurse   Safe discharge environment identified: Yes  Barriers to discharge: Yes Increase weakness. PT/OT scheduled.        Entered by: Claudia Osman RN 08/18/2024 8:23 AM     Please review provider order for any additional goals.   Nurse to notify provider when observation goals have been met and patient is ready for discharge.Goal Outcome Evaluation:

## 2024-08-18 NOTE — PROGRESS NOTES
[facility]    Medicine Progress Note - Hospitalist Service    Date of Admission:  8/17/2024    Assessment & Plan   uyen Ansari is a 89 year old female with advanced dementia, living in memory care who presents with about 5 days of trouble walking.     #Difficulty with ambulation  Started about 5 days earlier when memory care staff reported she was hanging onto things while walking, which is new, then found on the ground day of admission unable to get off the floor, also a change for her. Family and facility agree the patient is otherwise baseline, eating and drinking well, behaving like usual. Baseline is word salad, unable to determine symptoms. XR of bilateral legs from pelvis to ankles ae unremarkable. CT c-spine with age indeterminate compression fractures of C7, T1, T2. Without knowing her symptoms diagnostics will be difficult. Discussed with daughter about getting MRI imaging of the patient's brain and spine, which would require sedation, and daughter felt this wouldn't be within her goals of care, especially since fixes to any problem found would likely be surgical in nature and they would not want surgery. Also discussed that this may be a simple etiology, like an unwitnessed bruised hip or a twisted ankle that is limiting her, but she can't verbalize her symptoms.  - Update: UA possibly consistent with UTI, will treat, may have been contributing to weakness, though uncertain  - MRI requiring sedation not within GOC per daughter  - CK normal; myositis unlikely  --Could be related to underlying dementia/brain failure.  - PT ordered (already ordered for memory care, but to see if she would benefit from any mobility assistance devices)     #UTI  UA consistent with UTI. In setting of acute weakness will treat. Patient pulling out IV here, will give oral regimen.  -Continue cefdinir 300mg BID x5 days     #Advanced dementia  Baseline is A&Ox1, recognizes family. Speech fluent but  word salad.  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  - need to discuss with patient's daughter, though likely not within GOC to electively teat, so probably won't need to monitor.       Observation Goals: -diagnostic tests and consults completed and resulted, Nurse to notify provider when observation goals have been met and patient is ready for discharge.  Diet: Regular Diet Adult    DVT Prophylaxis: Low Risk/Ambulatory with no VTE prophylaxis indicated  Desouza Catheter: Not present  Lines: None     Cardiac Monitoring: None  Code Status: No CPR- Do NOT Intubate      Clinically Significant Risk Factors Present on Admission                  # Hypertension: Noted on problem list                          Disposition Plan     Medically Ready for Discharge: Anticipated Tomorrow             Nacho Robles MD  Hospitalist Service  [facility]  Securely message with [employer] (more info)  Text page via [employer] Paging/Directory   ______________________________________________________________________    Interval History   Patient new to me.  Chart reviewed.  Patient is sleeping.  Did receive Ativan at  1:06 AM.  Review of systems cannot be obtained.  Discontinued Ativan.  Physical Exam   Vital Signs: Temp: 98.2  F (36.8  C) Temp src: Oral BP: 116/78 Pulse: 111   Resp: 18 SpO2: 94 % O2 Device: None (Room air)    Weight: 126 lbs 5.18 oz    Patient is sleeping.    Medical Decision Making       25 MINUTES SPENT BY ME on the date of service doing chart review, history, exam, documentation & further activities per the note.  MANAGEMENT DISCUSSED with the following over the past  24 hours: Nursing and    NOTE(S)/MEDICAL RECORDS REVIEWED over the past 24 hours: H&P       Data     I have personally reviewed the following data over the past 24 hrs:    Procal: N/A CRP: N/A Lactic Acid: N/A         Imaging results reviewed over the past 24 hrs:   Recent Results (from the past 24 hour(s))   Ankle XR, G/E 3 views, right    Narrative    EXAM: XR ANKLE RIGHT G/E 3 VIEWS  LOCATION: [facility]  DATE: 8/17/2024    INDICATION: right leg pain  COMPARISON: None.      Impression    IMPRESSION: Slight soft tissue swelling about the ankle but no evidence for fracture or disruption of ankle mortise.   XR Ankle Left G/E 3 Views    Narrative    EXAM: XR ANKLE LEFT G/E 3 VIEWS  LOCATION: [facility]  DATE: 8/17/2024    INDICATION: left leg pain  COMPARISON: None.      Impression    IMPRESSION: The left ankle is negative for fracture or disruption of ankle mortise. Slight soft tissue swelling about the ankle.   XR Knee Left 1/2 Views    Narrative    EXAM: XR KNEE LEFT 1/2 VIEWS, XR KNEE RIGHT 1/2 VIEWS  LOCATION: [facility]  DATE: 8/17/2024    INDICATION: left leg pain  COMPARISON: None.      Impression    IMPRESSION: Mild degenerative narrowing medial compartment of both knees. No evidence for fracture on either side. No evidence for effusion on either side. Vascular calcifications bilaterally.   XR Knee Right 1/2 Views    Narrative    EXAM: XR KNEE LEFT 1/2 VIEWS, XR KNEE RIGHT 1/2 VIEWS  LOCATION: [facility]  DATE: 8/17/2024    INDICATION: left leg pain  COMPARISON: None.      Impression    IMPRESSION: Mild degenerative narrowing medial compartment of both knees. No evidence for fracture on either side. No evidence for effusion on either side. Vascular calcifications bilaterally.   XR Femur Left 2 Views    Narrative    EXAM: XR FEMUR LEFT 2 VIEWS  LOCATION: [facility]  DATE:  8/17/2024    INDICATION: leg pain  COMPARISON: None.      Impression    IMPRESSION: The left hip is negative for fracture or dislocation. The left femur is negative for fracture. Degenerative narrowing medial compartment left knee. The visualized portion of the left hemipelvis is negative. Contrast within the urinary   bladder.

## 2024-08-18 NOTE — PLAN OF CARE
PRIMARY DIAGNOSIS: GENERALIZED WEAKNESS    OUTPATIENT/OBSERVATION GOALS TO BE MET BEFORE DISCHARGE  1. Orthostatic performed: N/A    2. Tolerating PO medications: Yes    3. Return to near baseline physical activity: No    4. Cleared for discharge by consultants (if involved): No    Discharge Planner Nurse   Safe discharge environment identified: Yes  Barriers to discharge: Yes        Entered by: Sylvia Lares RN 08/18/2024 5:27 PM     Please review provider order for any additional goals.   Nurse to notify provider when observation goals have been met and patient is ready for discharge.Goal Outcome Evaluation:

## 2024-08-18 NOTE — PROGRESS NOTES
Care Management Follow Up    Length of Stay (days): 0    Expected Discharge Date: 08/18/2024    Anticipated Discharge Plan:       Transportation: TBD    PT Recommendations:    OT Recommendations:        Barriers to Discharge: medical stability    Prior Living Situation:   with       Patient/Spokesperson Updated: No    Additional Information:  SW completed chart review. Pt has dementia and presented to ED with difficulty walking. Pt lives in memory care facility. Pt is independent at baseline with ADLs. Waiting on therapy recs. CM to follow for discharge plan.     RAE Alfonso

## 2024-08-18 NOTE — H&P
[facility]    History and Physical - Hospitalist Service       Date of Admission:  8/17/2024    Assessment & Plan   Lora Ansari is a 89 year old female with advanced dementia, living in memory care who presents with about 5 days of trouble walking.    #Difficulty with ambulation  Started about 5 days earlier when memory care staff reported she was hanging onto things while walking, which is new, then found on the ground day of admission unable to get off the floor, also a change for her. Family and facility agree the patient is otherwise baseline, eating and drinking well, behaving like usual. Baseline is word salad, unable to determine symptoms. XR of bilateral legs from pelvis to ankles ae unremarkable. CT c-spine with age indeterminate compression fractures of C7, T1, T2. Without knowing her symptoms diagnostics will be difficult. Discussed with daughter about getting MRI imaging of the patient's brain and spine, which would require sedation, and daughter felt this wouldn't be within her goals of care, especially since fixes to any problem found would likely be surgical in nature and they would not want surgery. Also discussed that this may be a simple etiology, like an unwitnessed bruised hip or a twisted ankle that is limiting her, but she can't verbalize her symptoms.  - Update: UA possibly consistent with UTI, will treat, may have been contributing to weakness, though uncertain  - MRI requiring sedation not within GOC per daughter  - CK normal; myositis unlikely  - Consider PMR if symptoms persist for weeks to months, but would be very hard to diagnose  - PT ordered (already ordered for memory care, but to see if she would benefit from any mobility assistance devices)    #UTI  UA consistent with UTI. In setting of acute weakness will treat. Patient pulling out IV here, will give oral regimen.  - Start cefdinir 300mg BID x5 days    #Advanced dementia  Baseline is A&Ox1,  recognizes family. Speech fluent but word salad.  - Continue memory care services  - Continue home depakote at bedtime  - Continue home Ativan 0.5mg PO q6h PRN (avoid if othe meds working)  - Add seroquel 25mg at bedtime and melatonin at bedtime while here    #Neuropathy  - Continue home gabapentin 100mg BID    #Primary HTN  - Continue home amlodipine 10mg daily    #Incidental AAA finding  CT shows 5.6x5.7cm infrarenal AAA.  - need to discuss with patient's daughter, though likely not within GOC to electively teat, so probably won't need to monitor.         Observation Goals: -diagnostic tests and consults completed and resulted, Nurse to notify provider when observation goals have been met and patient is ready for discharge.  Diet: Regular Diet Adult    DVT Prophylaxis: Pneumatic Compression Devices  Desouza Catheter: Not present  Lines: None     Cardiac Monitoring: None  Code Status: No CPR- Do NOT Intubate      Clinically Significant Risk Factors Present on Admission                  # Hypertension: Noted on problem list                          Disposition Plan     Medically Ready for Discharge: Anticipated Tomorrow           LULY OLEARY MD  Hospitalist Service  [facility]  Securely message with [employer] (more info)  Text page via [employer] Paging/Directory     ______________________________________________________________________    Chief Complaint   Trouble walking    History is obtained from the patient's family    History of Present Illness   Lora Ansari is a 89 year old female with advanced dementia, living in memory care who presents with about 5 days of trouble walking. The patient is unable to provide a history nor give reliable information about symptoms. Per her daughter, the patient's memory care reports that on 8/13 she was having trouble walking. Normally independent mobility at baseline and the patient was needing to hold onto the railing to make it from her room to  the cafeteria and back. Then on the day of admission the patient was found on the floor of her apartment, seemingly okay, but unable to get up from the floor which is a change for her. The patient speaks in a word salad, changes answers to questions seconds later, unable to ascertain information from her.    On exam she appears comfortable, was eating [employer] when I came into the room. Per her daughter, her facility says she's been eating and drinking fine, behaving normally and her daughter agrees, the patient seems like her usual self. Her lab work is normal here.     CT head, c-spine, C/A/P are all unremarkable. Xrs of both of her legs from the pelvis to ankle showed some mild expected bilateral knee arthritis, but no acute fractures or dislocations. Some reported bilateral ankle swelling that doesn't correlate with exam. On exam, palpation of both of her legs led to some reported tenderness to palpation of the thighs, but this would change when repeated.      Past Medical History    No past medical history on file.    Past Surgical History   Past Surgical History:   Procedure Laterality Date    OOPHORECTOMY      2014    TONSILLECTOMY  1941       Prior to Admission Medications   Prior to Admission Medications   Prescriptions Last Dose Informant Patient Reported? Taking?   LORazepam (ATIVAN) 0.5 MG tablet 8/16/2024 at pm  Yes Yes   Sig: Take 0.5 mg by mouth every 6 hours as needed   acetaminophen (TYLENOL) 500 MG tablet 8/17/2024 at am  Yes Yes   Sig: Take 500-1,000 mg by mouth every 6 hours as needed for mild pain   amLODIPine (NORVASC) 10 MG tablet 8/17/2024 at am  Yes Yes   Sig: Take 10 mg by mouth daily   divalproex sodium delayed-release (DEPAKOTE SPRINKLE) 125 MG DR capsule 8/16/2024 at pm  Yes Yes   Sig: Take 2 capsules by mouth at bedtime   gabapentin (NEURONTIN) 100 MG capsule 8/17/2024 at am  Yes Yes   Sig: Take 100 mg by mouth 2 times daily   melatonin 3 MG tablet 8/16/2024 at pm  Yes Yes   Sig: Take  3 tablets by mouth at bedtime      Facility-Administered Medications: None           Physical Exam   Vital Signs: Temp: 97.4  F (36.3  C) Temp src: Axillary BP: 134/71 Pulse: 97   Resp: 20 SpO2: 95 % O2 Device: None (Room air)    Weight: 126 lbs 5.18 oz    General: No overt distress, conversational, non-toxic appearing  HEENT: MMM  Pulmonary: CTAB; no crackles, wheezing, or rhonchi, normal effort  Cardiac: RRR. No m/r/g  Abdomen: Soft. NT, ND.  Extremities: No bilateral LE edema. Reported TTP of the bilateral thighs, otherwise no TTP of the bilateral legs from toes to hips elsewhere.  Neuro: alert and awake, Ox1      Medical Decision Making       90 MINUTES SPENT BY ME on the date of service doing chart review, history, exam, documentation & further activities per the note.      Data     I have personally reviewed the following data over the past 24 hrs:    5.7  \   14.0   / 220     142 104 26.7 (H) /  133 (H)   4.1 24 1.24 (H) \     TSH: 1.41 T4: N/A A1C: N/A     Procal: N/A CRP: 23.50 (H) Lactic Acid: N/A       INR:  1.08 PTT:  30   D-dimer:  N/A Fibrinogen:  N/A       Imaging results reviewed over the past 24 hrs:   Recent Results (from the past 24 hour(s))   Head CT w/o contrast    Narrative    EXAM: CT HEAD W/O CONTRAST  LOCATION: [facility]  DATE: 8/17/2024    INDICATION: Unwitnessed fall, dementia.  COMPARISON: None.  TECHNIQUE: Routine CT Head without IV contrast. Multiplanar reformats. Dose reduction techniques were used.    FINDINGS:  INTRACRANIAL CONTENTS: No intracranial hemorrhage, extraaxial collection, or mass effect. No CT evidence of acute infarct. Moderate presumed chronic small vessel ischemic changes. Moderate generalized volume loss. No hydrocephalus.     VISUALIZED ORBITS/SINUSES/MASTOIDS: No intraorbital abnormality. No paranasal sinus mucosal disease. No middle ear or mastoid effusion.    BONES/SOFT TISSUES: No acute abnormality.      Impression    IMPRESSION:  1.  No  CT evidence for acute intracranial process.  2.  Brain atrophy and presumed chronic microvascular ischemic changes as above.   CT Cervical Spine w/o Contrast    Narrative    EXAM: CT CERVICAL SPINE WITHOUT CONTRAST  LOCATION: [facility]  DATE: 08/17/2024    INDICATION: Precautionary, unwitnessed fall.  COMPARISON: None.  TECHNIQUE: Routine CT Cervical Spine without IV contrast. Multiplanar reformats. Dose reduction techniques were used.      Impression    IMPRESSION: There are age-indeterminate mild compression fracture deformities of the superior aspects of the C7, T1 and T2 vertebral bodies. Recent fractures cannot be excluded. Cervical spine MRI recommended to evaluate for edema that would signify   recent fracture. Vertebral body heights and contours otherwise normal. Minimal degenerative anterolisthesis of C5 upon C6 and C6 upon C7. Alignment otherwise normal. No spinal canal stenosis. No prevertebral soft tissue swelling.     CT Chest/Abdomen/Pelvis w Contrast    Narrative    EXAM: CT CHEST/ABDOMEN/PELVIS W CONTRAST  LOCATION: [facility]  DATE: 8/17/2024    INDICATION: Unwitnessed fall, not walking. Eval pelvis fracture; daughter reports abdomen appears distended (history of dementia, patient unable to provide useful history)  COMPARISON: None.  TECHNIQUE: CT scan of the chest, abdomen, and pelvis was performed following injection of IV contrast. Multiplanar reformats were obtained. Dose reduction techniques were used.   CONTRAST: Isovue 370 70 mL    FINDINGS:   LUNGS AND PLEURA: Normal.    MEDIASTINUM/AXILLAE: Moderately enlarged heart. Prosthetic aortic valve. Tortuous atherosclerotic aorta. Patent main pulmonary arteries. Small hiatal hernia. No lymphadenopathy. No axillary lymphadenopathy.    CORONARY ARTERY CALCIFICATION: Moderate.    HEPATOBILIARY: Normal.    PANCREAS: Normal.    SPLEEN: Normal.    ADRENAL GLANDS: Normal.    KIDNEYS/BLADDER: Simple  cysts, no follow-up required. Slight bladder wall thickening, correlate to exclude cystitis.    BOWEL: Scattered diverticulosis. No diverticulitis, colitis, or obstruction. No free air or fluid.    LYMPH NODES: Normal.    VASCULATURE: 5.6 x 5.7 cm infrarenal abdominal aortic aneurysm with mural thrombus.    PELVIC ORGANS: Normal.    MUSCULOSKELETAL: Unchanged severe compression deformity of T12 with retropulsed fragments in the spinal canal. Stable mild T4 compression deformity. Moderate multilevel discogenic degenerative change. No acute fractures or dislocations.      Impression    IMPRESSION:  1.  No acute traumatic visceral, vascular, or osseous injury of the chest, abdomen, and pelvis.  2.  5.6 x 5.7 cm infrarenal abdominal aortic aneurysm.  3.  Moderately enlarged heart with coronary artery disease and atherosclerotic vascular disease.  4.  Diverticulosis. No diverticulitis, colitis, or obstruction.   XR Femur Right 2 Views    Narrative    EXAM: XR FEMUR RIGHT 2 VIEWS  LOCATION: [facility]  DATE: 8/17/2024    INDICATION: right femur tenderness   patient not ambulating x 4 days (is amulatory at baseline)  COMPARISON: None.      Impression    IMPRESSION: The right hip is negative for fracture. The right hemipelvis is negative for fracture. Diffuse demineralization. Contrast material within the urinary bladder.   Ankle XR, G/E 3 views, right    Narrative    EXAM: XR ANKLE RIGHT G/E 3 VIEWS  LOCATION: [facility]  DATE: 8/17/2024    INDICATION: right leg pain  COMPARISON: None.      Impression    IMPRESSION: Slight soft tissue swelling about the ankle but no evidence for fracture or disruption of ankle mortise.   XR Ankle Left G/E 3 Views    Narrative    EXAM: XR ANKLE LEFT G/E 3 VIEWS  LOCATION: [facility]  DATE: 8/17/2024    INDICATION: left leg pain  COMPARISON: None.      Impression    IMPRESSION: The left ankle is negative for fracture  or disruption of ankle mortise. Slight soft tissue swelling about the ankle.   XR Knee Left 1/2 Views    Narrative    EXAM: XR KNEE LEFT 1/2 VIEWS, XR KNEE RIGHT 1/2 VIEWS  LOCATION: [facility]  DATE: 8/17/2024    INDICATION: left leg pain  COMPARISON: None.      Impression    IMPRESSION: Mild degenerative narrowing medial compartment of both knees. No evidence for fracture on either side. No evidence for effusion on either side. Vascular calcifications bilaterally.   XR Knee Right 1/2 Views    Narrative    EXAM: XR KNEE LEFT 1/2 VIEWS, XR KNEE RIGHT 1/2 VIEWS  LOCATION: [facility]  DATE: 8/17/2024    INDICATION: left leg pain  COMPARISON: None.      Impression    IMPRESSION: Mild degenerative narrowing medial compartment of both knees. No evidence for fracture on either side. No evidence for effusion on either side. Vascular calcifications bilaterally.   XR Femur Left 2 Views    Narrative    EXAM: XR FEMUR LEFT 2 VIEWS  LOCATION: [facility]  DATE: 8/17/2024    INDICATION: leg pain  COMPARISON: None.      Impression    IMPRESSION: The left hip is negative for fracture or dislocation. The left femur is negative for fracture. Degenerative narrowing medial compartment left knee. The visualized portion of the left hemipelvis is negative. Contrast within the urinary   bladder.

## 2024-08-19 ENCOUNTER — APPOINTMENT (OUTPATIENT)
Dept: PHYSICAL THERAPY | Facility: HOSPITAL | Age: 89
End: 2024-08-19
Payer: COMMERCIAL

## 2024-08-19 LAB
ATRIAL RATE - MUSE: 98 BPM
DIASTOLIC BLOOD PRESSURE - MUSE: 74 MMHG
INTERPRETATION ECG - MUSE: NORMAL
P AXIS - MUSE: 17 DEGREES
PR INTERVAL - MUSE: 132 MS
QRS DURATION - MUSE: 116 MS
QT - MUSE: 360 MS
QTC - MUSE: 459 MS
R AXIS - MUSE: -61 DEGREES
SYSTOLIC BLOOD PRESSURE - MUSE: 112 MMHG
T AXIS - MUSE: -4 DEGREES
VENTRICULAR RATE- MUSE: 98 BPM

## 2024-08-19 PROCEDURE — 250N000013 HC RX MED GY IP 250 OP 250 PS 637: Performed by: STUDENT IN AN ORGANIZED HEALTH CARE EDUCATION/TRAINING PROGRAM

## 2024-08-19 PROCEDURE — 97530 THERAPEUTIC ACTIVITIES: CPT | Mod: GP

## 2024-08-19 PROCEDURE — 97110 THERAPEUTIC EXERCISES: CPT | Mod: CQ,GP

## 2024-08-19 PROCEDURE — G0378 HOSPITAL OBSERVATION PER HR: HCPCS

## 2024-08-19 PROCEDURE — 99232 SBSQ HOSP IP/OBS MODERATE 35: CPT | Performed by: INTERNAL MEDICINE

## 2024-08-19 PROCEDURE — 97116 GAIT TRAINING THERAPY: CPT | Mod: CQ,GP

## 2024-08-19 RX ADMIN — QUETIAPINE FUMARATE 25 MG: 25 TABLET ORAL at 21:59

## 2024-08-19 RX ADMIN — GABAPENTIN 100 MG: 100 CAPSULE ORAL at 10:16

## 2024-08-19 RX ADMIN — ACETAMINOPHEN 975 MG: 325 TABLET ORAL at 18:51

## 2024-08-19 RX ADMIN — CEFDINIR 300 MG: 300 CAPSULE ORAL at 10:16

## 2024-08-19 RX ADMIN — GABAPENTIN 100 MG: 100 CAPSULE ORAL at 21:59

## 2024-08-19 RX ADMIN — DIVALPROEX SODIUM 250 MG: 125 CAPSULE, COATED PELLETS ORAL at 21:59

## 2024-08-19 RX ADMIN — Medication 3 MG: at 21:59

## 2024-08-19 RX ADMIN — AMLODIPINE BESYLATE 10 MG: 5 TABLET ORAL at 10:16

## 2024-08-19 ASSESSMENT — ACTIVITIES OF DAILY LIVING (ADL)
ADLS_ACUITY_SCORE: 54
ADLS_ACUITY_SCORE: 53
ADLS_ACUITY_SCORE: 54
ADLS_ACUITY_SCORE: 54
ADLS_ACUITY_SCORE: 53
ADLS_ACUITY_SCORE: 54
ADLS_ACUITY_SCORE: 53
ADLS_ACUITY_SCORE: 54
ADLS_ACUITY_SCORE: 53
ADLS_ACUITY_SCORE: 54

## 2024-08-19 NOTE — PROGRESS NOTES
PRIMARY DIAGNOSIS: GENERALIZED WEAKNESS    OUTPATIENT/OBSERVATION GOALS TO BE MET BEFORE DISCHARGE  1. Orthostatic performed: No    2. Tolerating PO medications: Yes    3. Return to near baseline physical activity: Yes    4. Cleared for discharge by consultants (if involved): Yes expected discharge tomorrow    Discharge Planner Nurse   Safe discharge environment identified: Yes  Barriers to discharge: Yes, continue to observe gait and strength with transferring and ambulation       Entered by: Patricia Elder RN 08/19/2024 4:48 PM     Please review provider order for any additional goals.   Nurse to notify provider when observation goals have been met and patient is ready for discharge.

## 2024-08-19 NOTE — PROGRESS NOTES
"Care Management Follow Up    Length of Stay (days): 0    Expected Discharge Date: 08/20/2024     Concerns to be Addressed: Care progression - discharge planning     Patient plan of care discussed at interdisciplinary rounds: Yes    Anticipated Discharge Disposition:  Home with home care PT     Anticipated Discharge Services:  Home with home care PT  Anticipated Discharge DME:  NA    Patient/family educated on Medicare website which has current facility and service quality ratings:  NA  Education Provided on the Discharge Plan:  Yes per team  Patient/Family in Agreement with the Plan:  NA    Referrals Placed by CM/SW:  NA  Private pay costs discussed: Not applicable    Additional Information:  Per Dr. Benitez, patient may need placement.    Social Hx: \"Live at [facility] [phone]. Dtr is primary contact. PT rec home with home care PT. Transport TBD.\"     RNCM to follow for medical progression, recommendations, and final discharge plan.     Matilde Townsend, RN     [id number] rec'd a page from [facility], changing discharge rec to Memory Care with home PT.    Called patient's daughter, Dorothy, to update. Dorothy is OK with PT rec.    1154 called [facility] [phone] and spoke with Guerita. She wants to know how patient is transferring and walking before accepting. Would like PT notes fax to her for review  Fax [phone] rec'd a call from Guerita. She reviewed the records and PT notes and is concern about taking patient back today. It appears patient was not doing well yesterday and improved some today, but not at her baseline. Patient was independent with transferring and ambulation. Guerita request to keep patient another day to make sure patient is still improving.    Message sent to Dr. Travis Robles ok to discharge tomorrow    Called to update Guerita and Dorothy.  "

## 2024-08-19 NOTE — PLAN OF CARE
Problem: Adult Inpatient Plan of Care  Goal: Plan of Care Review  Description: The Plan of Care Review/Shift note should be completed every shift.  The Outcome Evaluation is a brief statement about your assessment that the patient is improving, declining, or no change.  This information will be displayed automatically on your shift  note.  Outcome: Progressing     Problem: Fall Injury Risk  Goal: Absence of Fall and Fall-Related Injury  Outcome: Progressing     Problem: UTI (Urinary Tract Infection)  Goal: Improved Infection Symptoms  Outcome: Progressing   Goal Outcome Evaluation:                  Pt. Is alert to self. illogical and confused. Was 1:1 this am. Trying to wean her off 1:1.Took meds in apple sauce. RA. No IV site. Continue with POC.

## 2024-08-19 NOTE — PLAN OF CARE
PRIMARY DIAGNOSIS: GENERALIZED WEAKNESS    OUTPATIENT/OBSERVATION GOALS TO BE MET BEFORE DISCHARGE  1. Orthostatic performed: No    2. Tolerating PO medications: Yes    3. Return to near baseline physical activity: No    4. Cleared for discharge by consultants (if involved): No    Discharge Planner Nurse   Safe discharge environment identified: Yes  Barriers to discharge:  still has bedside attendant for safety.        Entered by: Cass Jensen RN 08/19/2024 5:35 AM   Pt slept during the night. Did not take V/S per family's request while pt is sleeping.  Please review provider order for any additional goals.   Nurse to notify provider when observation goals have been met and patient is ready for discharge.Goal Outcome Evaluation:

## 2024-08-19 NOTE — PLAN OF CARE
Patient is on a 1:1 for safety, slept through cares.       Problem: Adult Inpatient Plan of Care  Goal: Plan of Care Review  Description: The Plan of Care Review/Shift note should be completed every shift.  The Outcome Evaluation is a brief statement about your assessment that the patient is improving, declining, or no change.  This information will be displayed automatically on your shift  note.  8/18/2024 2220 by Sylvia Lares RN  Outcome: Progressing  8/18/2024 2136 by Sylvia Lares RN  Outcome: Progressing  8/18/2024 1727 by Sylvia Lares, RN  Outcome: Progressing   Goal Outcome Evaluation:

## 2024-08-19 NOTE — PROGRESS NOTES
PRIMARY DIAGNOSIS: GENERALIZED WEAKNESS    OUTPATIENT/OBSERVATION GOALS TO BE MET BEFORE DISCHARGE  1. Orthostatic performed: N/A    2. Tolerating PO medications: Yes    3. Return to near baseline physical activity: Yes. Ambulated in hallway with therapy.     4. Cleared for discharge by consultants (if involved): No    Discharge Planner Nurse   Safe discharge environment identified: Yes  Barriers to discharge:  Trialing off 1:1. Continue to monitor.        Entered by: Nadia Gramajo RN 08/19/2024 2:40 PM   Pt up in chair for a few hours, wanted to lay down after therapy. Ate 75% lunch. Feed self. Up to bedside commode, did not urinate. Pleasantly confused.

## 2024-08-19 NOTE — PROGRESS NOTES
PRIMARY DIAGNOSIS: GENERALIZED WEAKNESS    OUTPATIENT/OBSERVATION GOALS TO BE MET BEFORE DISCHARGE  1. Orthostatic performed: N/A    2. Tolerating PO medications: Yes    3. Return to near baseline physical activity: Yes    4. Cleared for discharge by consultants (if involved): No    Discharge Planner Nurse   Safe discharge environment identified: Yes  Barriers to discharge:  Trialing off 1:1. Started at 1100.        Entered by: Nadia Gramajo RN 08/19/2024 2:37 PM    Pt slept until 1000. Incontinent, pleasantly confused. Alert and oriented to self only. Trialing off 1:1. Ate 50% breakfast. Up in chair.

## 2024-08-19 NOTE — PLAN OF CARE
Problem: UTI (Urinary Tract Infection)  Goal: Improved Infection Symptoms  Outcome: Progressing   Goal Outcome Evaluation:       Pt slept during the night.Bedside attendant in place for safety.                  - Cr. 1.52, Unclear what the pts baseline. Will reach out to the PMD  - Gentle IVF in the setting of diarrhea

## 2024-08-19 NOTE — PLAN OF CARE
PRIMARY DIAGNOSIS: GENERALIZED WEAKNESS    OUTPATIENT/OBSERVATION GOALS TO BE MET BEFORE DISCHARGE  1. Orthostatic performed: No    2. Tolerating PO medications: Yes    3. Return to near baseline physical activity: No    4. Cleared for discharge by consultants (if involved): No    Discharge Planner Nurse   Safe discharge environment identified: Yes  Barriers to discharge:  has bedside attendant for safety.       Entered by: Cass Jensen RN 08/19/2024 2:15 AM   Pt is sleeping comfortably.  Please review provider order for any additional goals.   Nurse to notify provider when observation goals have been met and patient is ready for discharge.Goal Outcome Evaluation:

## 2024-08-19 NOTE — PROGRESS NOTES
[facility]    Medicine Progress Note - Hospitalist Service    Date of Admission:  8/17/2024    Assessment & Plan   uyen Ansari is a 89 year old female with advanced dementia, living in memory care who presents with about 5 days of trouble walking.  No reversible cause found.  Urine culture is pending.  Currently on empirical Omnicef.     #Difficulty with ambulation  Started about 5 days earlier when memory care staff reported she was hanging onto things while walking, which is new, then found on the ground day of admission unable to get off the floor, also a change for her. Family and facility agree the patient is otherwise baseline, eating and drinking well, behaving like usual. Baseline is word salad, unable to determine symptoms. XR of bilateral legs from pelvis to ankles ae unremarkable. CT c-spine with age indeterminate compression fractures of C7, T1, T2. Without knowing her symptoms diagnostics will be difficult. Discussed with daughter about getting MRI imaging of the patient's brain and spine, which would require sedation, and daughter felt this wouldn't be within her goals of care, especially since fixes to any problem found would likely be surgical in nature and they would not want surgery. Also discussed that this may be a simple etiology, like an unwitnessed bruised hip or a twisted ankle that is limiting her, but she can't verbalize her symptoms.  - Update: UA possibly consistent with UTI, will treat, may have been contributing to weakness, though uncertain  - MRI requiring sedation not within GOC per daughter  - CK normal; myositis unlikely  --Could be related to underlying dementia/brain failure.  - PT ordered (already ordered for memory care, but to see if she would benefit from any mobility assistance devices)     #UTI  UA consistent with UTI. In setting of acute weakness will treat. Patient pulling out IV here, will give oral regimen.  -Continue cefdinir 300mg BID x5  days     #Advanced dementia  Baseline is A&Ox1, recognizes family. Speech fluent but word salad.  - Continue memory care services  - Continue home depakote at bedtime  -Hold Ativan due to sleepiness  Added Zyprexa for agitation   Agitation is controlled with Seroquel    #Neuropathy  - Continue home gabapentin 100mg BID     #Primary HTN  - Continue home amlodipine 10mg daily     #Incidental AAA finding  CT shows 5.6 x 5.7cm infrarenal AAA.  - need to discuss with patient's daughter, though likely not within GOC to electively teat, so probably won't need to monitor.       Observation Goals: -diagnostic tests and consults completed and resulted, Nurse to notify provider when observation goals have been met and patient is ready for discharge.  Diet: Regular Diet Adult    DVT Prophylaxis: Low Risk/Ambulatory with no VTE prophylaxis indicated  Desouza Catheter: Not present  Lines: None     Cardiac Monitoring: None  Code Status: No CPR- Do NOT Intubate      Clinically Significant Risk Factors Present on Admission                  # Hypertension: Noted on problem list                          Disposition Plan     Medically Ready for Discharge: Anticipated Tomorrow             Nacho Robles MD  Hospitalist Service  [facility]  Securely message with [employer] (more info)  Text page via [employer] Paging/Directory   ______________________________________________________________________    Interval History   Patient is more awake but speech is tangential.  Review of systems cannot be obtained.  Awaiting placement.    Physical Exam   Vital Signs: Temp: 98.4  F (36.9  C) Temp src: Oral BP: 138/81 Pulse: 89   Resp: 18 SpO2: 91 % O2 Device: None (Room air)    Weight: 126 lbs 5.18 oz    Patient is awake but cannot give reliable information.  No apparent distress.  Is able to move all 4 extremities.    Medical Decision Making       25 MINUTES SPENT BY ME on the date of service doing chart review, history, exam,  documentation & further activities per the note.  MANAGEMENT DISCUSSED with the following over the past 24 hours: Nursing and    NOTE(S)/MEDICAL RECORDS REVIEWED over the past 24 hours: H&P       Data         Imaging results reviewed over the past 24 hrs:   No results found for this or any previous visit (from the past 24 hour(s)).

## 2024-08-19 NOTE — PLAN OF CARE
PRIMARY DIAGNOSIS: GENERALIZED WEAKNESS    OUTPATIENT/OBSERVATION GOALS TO BE MET BEFORE DISCHARGE  1. Orthostatic performed: N/A    2. Tolerating PO medications: Yes    3. Return to near baseline physical activity: No    4. Cleared for discharge by consultants (if involved): No    Discharge Planner Nurse   Safe discharge environment identified: Yes  Barriers to discharge: Yes       Entered by: Sylvia Lares RN 08/18/2024 9:36 PM     Please review provider order for any additional goals.   Nurse to notify provider when observation goals have been met and patient is ready for discharge.

## 2024-08-20 ENCOUNTER — APPOINTMENT (OUTPATIENT)
Dept: PHYSICAL THERAPY | Facility: HOSPITAL | Age: 89
End: 2024-08-20
Payer: COMMERCIAL

## 2024-08-20 VITALS
HEIGHT: 61 IN | HEART RATE: 69 BPM | DIASTOLIC BLOOD PRESSURE: 82 MMHG | OXYGEN SATURATION: 95 % | TEMPERATURE: 97.7 F | RESPIRATION RATE: 18 BRPM | SYSTOLIC BLOOD PRESSURE: 121 MMHG | BODY MASS INDEX: 23.85 KG/M2 | WEIGHT: 126.32 LBS

## 2024-08-20 PROCEDURE — 99239 HOSP IP/OBS DSCHRG MGMT >30: CPT | Performed by: INTERNAL MEDICINE

## 2024-08-20 PROCEDURE — 97116 GAIT TRAINING THERAPY: CPT | Mod: GP

## 2024-08-20 PROCEDURE — 250N000013 HC RX MED GY IP 250 OP 250 PS 637: Performed by: STUDENT IN AN ORGANIZED HEALTH CARE EDUCATION/TRAINING PROGRAM

## 2024-08-20 PROCEDURE — G0378 HOSPITAL OBSERVATION PER HR: HCPCS

## 2024-08-20 RX ORDER — CEFDINIR 300 MG/1
300 CAPSULE ORAL DAILY
Qty: 2 CAPSULE | Refills: 0 | Status: SHIPPED | OUTPATIENT
Start: 2024-08-21 | End: 2024-08-23

## 2024-08-20 RX ORDER — QUETIAPINE FUMARATE 25 MG/1
25 TABLET, FILM COATED ORAL AT BEDTIME
Qty: 30 TABLET | Refills: 0 | Status: SHIPPED | OUTPATIENT
Start: 2024-08-20

## 2024-08-20 RX ADMIN — CEFDINIR 300 MG: 300 CAPSULE ORAL at 08:18

## 2024-08-20 RX ADMIN — AMLODIPINE BESYLATE 10 MG: 5 TABLET ORAL at 08:19

## 2024-08-20 RX ADMIN — GABAPENTIN 100 MG: 100 CAPSULE ORAL at 08:18

## 2024-08-20 ASSESSMENT — ACTIVITIES OF DAILY LIVING (ADL)
ADLS_ACUITY_SCORE: 53
ADLS_ACUITY_SCORE: 54
ADLS_ACUITY_SCORE: 53

## 2024-08-20 NOTE — PLAN OF CARE
PRIMARY DIAGNOSIS: GENERALIZED WEAKNESS    OUTPATIENT/OBSERVATION GOALS TO BE MET BEFORE DISCHARGE  1. Orthostatic performed: N/A    2. Tolerating PO medications: Yes    3. Return to near baseline physical activity: Yes    4. Cleared for discharge by consultants (if involved): Yes    Discharge Planner Nurse   Safe discharge environment identified: Yes  Barriers to discharge: No, discharging today at [id number]       Entered by: Guerita Westbrook RN 08/20/2024 9:24 AM     Please review provider order for any additional goals.   Nurse to notify provider when observation goals have been met and patient is ready for discharge.Goal Outcome Evaluation:

## 2024-08-20 NOTE — PLAN OF CARE
Problem: Adult Inpatient Plan of Care  Goal: Absence of Hospital-Acquired Illness or Injury  Intervention: Identify and Manage Fall Risk  Recent Flowsheet Documentation  Taken 2024 by Patricia Elder RN  Safety Promotion/Fall Prevention:   activity supervised   assistive device/personal items within reach   increased rounding and observation   increase visualization of patient   nonskid shoes/slippers when out of bed   patient and family education   room door open   room near nurse's station   safety round/check completed     Problem: Fall Injury Risk  Goal: Absence of Fall and Fall-Related Injury  Intervention: Identify and Manage Contributors  Recent Flowsheet Documentation  Taken 2024 by Patricia Elder RN  Medication Review/Management: medications reviewed  Intervention: Promote Injury-Free Environment  Recent Flowsheet Documentation  Taken 2024 by Patricia Elder RN  Safety Promotion/Fall Prevention:   activity supervised   assistive device/personal items within reach   increased rounding and observation   increase visualization of patient   nonskid shoes/slippers when out of bed   patient and family education   room door open   room near nurse's station   safety round/check completed   Goal Outcome Evaluation:         Aox1- person, calm and cooperative initially this shift. After dinner, pt begun to be uncooperative, refused to walk in the hallway and get brief checked/changed. Reinforced importance. Combative at bedtime, scheduled seroquel and sleep meds given.  Denies pain, sob/.  Expected discharge tomorrow.

## 2024-08-20 NOTE — PROGRESS NOTES
Care Management Discharge Note    Discharge Date: 08/20/2024       Discharge Disposition: Assisted Living - [facility] with [facility] for PT    Discharge Services: Assisted [facility] with [facility] for PT    Discharge DME: None    Discharge Transportation: family or friend will provide    Private pay costs discussed: Not applicable    Does the patient's insurance plan have a 3 day qualifying hospital stay waiver?  No    PAS Confirmation Code:  NA  Patient/family educated on Medicare website which has current facility and service quality ratings: NA    Education Provided on the Discharge Plan: Yes per team  Persons Notified of Discharge Plans: Patient  Patient/Family in Agreement with the Plan: yes    Handoff Referral Completed: Yes    Additional Information:  Patient discharge to [facility] with [facility] for PT. Family will transport between [id number].    Orders sent to [facility]  Orders fax to Guerita at [facility] [phone]    Matilde Townsend RN

## 2024-08-20 NOTE — DISCHARGE SUMMARY
[facility]  Hospitalist Discharge Summary      Date of Admission:  8/17/2024  Date of Discharge:  8/20/2024  Discharging Provider: Nacho Robles MD  Discharge Service: Hospitalist Service    Discharge Diagnoses   Gait instability/weakness/acute cystitis   advanced dementia      Clinically Significant Risk Factors          Follow-ups Needed After Discharge   Follow-up Appointments     Follow-up and recommended labs and tests       Follow up with primary care provider, Physician No Ref-Primary, within 7   days for hospital follow- up.  No follow up labs or test are needed.            Unresulted Labs Ordered in the Past 30 Days of this Admission       Date and Time Order Name Status Description    8/17/2024  6:52 PM Urine Culture Preliminary         These results will be followed up by PCP    Discharge Disposition   Discharged to home  Memory care unit with home PT  Condition at discharge: Stable    Hospital Course   uyen Ansari is a 89 year old female with advanced dementia, living in memory care who presents with about 5 days of trouble walking.  No reversible cause found.  Urine culture is pending.  Currently on empirical Omnicef.     #Difficulty with ambulation  Started about 5 days earlier when memory care staff reported she was hanging onto things while walking, which is new, then found on the ground day of admission unable to get off the floor, also a change for her. Family and facility agree the patient is otherwise baseline, eating and drinking well, behaving like usual. Baseline is word salad, unable to determine symptoms. XR of bilateral legs from pelvis to ankles ae unremarkable. CT c-spine with age indeterminate compression fractures of C7, T1, T2. Without knowing her symptoms diagnostics will be difficult. Discussed with daughter about getting MRI imaging of the patient's brain and spine, which would require sedation, and daughter felt this wouldn't be within her goals of  care, especially since fixes to any problem found would likely be surgical in nature and they would not want surgery. Also discussed that this may be a simple etiology, like an unwitnessed bruised hip or a twisted ankle that is limiting her, but she can't verbalize her symptoms.  -: UA possibly consistent with UTI, will treat, may have been contributing to weakness, though uncertain but improving with treatment of UTI.  - MRI requiring sedation not within GOC per daughter  - CK normal; myositis unlikely  --Could be related to underlying dementia/brain failure.  - PT ordered (already ordered for memory care, but to see if she would benefit from any mobility assistance devices)     #U acute cystitis without hematuria  UA consistent with UTI. In setting of acute weakness will treat. Patient pulling out IV here, will give oral regimen.  -Continue cefdinir 300mg BID x5 days.  Completed 3 days of antibiotics in the hospital and would need 2 more days to completed 5-day course urine culture shows E. coli       #Advanced dementia  Baseline is A&Ox1, recognizes family. Speech fluent but word salad.  - Continue memory care services  - Continue home depakote at bedtime  -Hold Ativan due to sleepiness  Added Zyprexa for agitation   Agitation is controlled with Seroquel.  Memory care unit request Seroquel to be continued at bedtime at discharge.    #Neuropathy  - Continue home gabapentin 100mg BID     #Primary HTN  - Continue home amlodipine 10mg daily     #Incidental AAA finding  CT shows 5.6 x 5.7cm infrarenal AAA.  - need to discuss with patient's daughter, though likely not within GOC to electively teat, so probably won't need to monitor.    Consultations This Hospital Stay   PHYSICAL THERAPY ADULT IP CONSULT  CARE MANAGEMENT / SOCIAL WORK IP CONSULT    Code Status   No CPR- Do NOT Intubate    Time Spent on this Encounter   Nacho AUGUSTE MD, personally saw the patient today and spent greater than 30 minutes discharging  this patient.       Nacho Robles MD  [address] [address]  Phone: [phone]  Fax: [phone]  ______________________________________________________________________    Physical Exam   Vital Signs: Temp: 97.7  F (36.5  C) Temp src: Oral BP: 121/82 Pulse: 69   Resp: 18 SpO2: 95 % O2 Device: None (Room air)    Weight: 126 lbs 5.18 oz  Patient was awake but does not answer appropriately  No apparent distress       Primary Care Physician   Physician No Ref-Primary    Discharge Orders      Reason for your hospital stay    Gait instability     Follow-up and recommended labs and tests     Follow up with primary care provider, Physician No Ref-Primary, within 7 days for hospital follow- up.  No follow up labs or test are needed.     Activity    Your activity upon discharge: activity as tolerated     Resume Home Care Services    Resume home physical therapy.     Diet    Follow this diet upon discharge: Regular       Significant Results and Procedures   Results for orders placed or performed during the hospital encounter of 08/17/24   Head CT w/o contrast    Narrative    EXAM: CT HEAD W/O CONTRAST  LOCATION: [facility]  DATE: 8/17/2024    INDICATION: Unwitnessed fall, dementia.  COMPARISON: None.  TECHNIQUE: Routine CT Head without IV contrast. Multiplanar reformats. Dose reduction techniques were used.    FINDINGS:  INTRACRANIAL CONTENTS: No intracranial hemorrhage, extraaxial collection, or mass effect. No CT evidence of acute infarct. Moderate presumed chronic small vessel ischemic changes. Moderate generalized volume loss. No hydrocephalus.     VISUALIZED ORBITS/SINUSES/MASTOIDS: No intraorbital abnormality. No paranasal sinus mucosal disease. No middle ear or mastoid effusion.    BONES/SOFT TISSUES: No acute abnormality.      Impression    IMPRESSION:  1.  No CT evidence for acute intracranial process.  2.  Brain atrophy and  presumed chronic microvascular ischemic changes as above.   CT Cervical Spine w/o Contrast    Narrative    EXAM: CT CERVICAL SPINE WITHOUT CONTRAST  LOCATION: [facility]  DATE: 08/17/2024    INDICATION: Precautionary, unwitnessed fall.  COMPARISON: None.  TECHNIQUE: Routine CT Cervical Spine without IV contrast. Multiplanar reformats. Dose reduction techniques were used.      Impression    IMPRESSION: There are age-indeterminate mild compression fracture deformities of the superior aspects of the C7, T1 and T2 vertebral bodies. Recent fractures cannot be excluded. Cervical spine MRI recommended to evaluate for edema that would signify   recent fracture. Vertebral body heights and contours otherwise normal. Minimal degenerative anterolisthesis of C5 upon C6 and C6 upon C7. Alignment otherwise normal. No spinal canal stenosis. No prevertebral soft tissue swelling.     CT Chest/Abdomen/Pelvis w Contrast    Narrative    EXAM: CT CHEST/ABDOMEN/PELVIS W CONTRAST  LOCATION: [facility]  DATE: 8/17/2024    INDICATION: Unwitnessed fall, not walking. Eval pelvis fracture; daughter reports abdomen appears distended (history of dementia, patient unable to provide useful history)  COMPARISON: None.  TECHNIQUE: CT scan of the chest, abdomen, and pelvis was performed following injection of IV contrast. Multiplanar reformats were obtained. Dose reduction techniques were used.   CONTRAST: Isovue 370 70 mL    FINDINGS:   LUNGS AND PLEURA: Normal.    MEDIASTINUM/AXILLAE: Moderately enlarged heart. Prosthetic aortic valve. Tortuous atherosclerotic aorta. Patent main pulmonary arteries. Small hiatal hernia. No lymphadenopathy. No axillary lymphadenopathy.    CORONARY ARTERY CALCIFICATION: Moderate.    HEPATOBILIARY: Normal.    PANCREAS: Normal.    SPLEEN: Normal.    ADRENAL GLANDS: Normal.    KIDNEYS/BLADDER: Simple cysts, no follow-up required. Slight bladder wall thickening, correlate  to exclude cystitis.    BOWEL: Scattered diverticulosis. No diverticulitis, colitis, or obstruction. No free air or fluid.    LYMPH NODES: Normal.    VASCULATURE: 5.6 x 5.7 cm infrarenal abdominal aortic aneurysm with mural thrombus.    PELVIC ORGANS: Normal.    MUSCULOSKELETAL: Unchanged severe compression deformity of T12 with retropulsed fragments in the spinal canal. Stable mild T4 compression deformity. Moderate multilevel discogenic degenerative change. No acute fractures or dislocations.      Impression    IMPRESSION:  1.  No acute traumatic visceral, vascular, or osseous injury of the chest, abdomen, and pelvis.  2.  5.6 x 5.7 cm infrarenal abdominal aortic aneurysm.  3.  Moderately enlarged heart with coronary artery disease and atherosclerotic vascular disease.  4.  Diverticulosis. No diverticulitis, colitis, or obstruction.   XR Femur Right 2 Views    Narrative    EXAM: XR FEMUR RIGHT 2 VIEWS  LOCATION: [facility]  DATE: 8/17/2024    INDICATION: right femur tenderness   patient not ambulating x 4 days (is amulatory at baseline)  COMPARISON: None.      Impression    IMPRESSION: The right hip is negative for fracture. The right hemipelvis is negative for fracture. Diffuse demineralization. Contrast material within the urinary bladder.   XR Femur Left 2 Views    Narrative    EXAM: XR FEMUR LEFT 2 VIEWS  LOCATION: [facility]  DATE: 8/17/2024    INDICATION: leg pain  COMPARISON: None.      Impression    IMPRESSION: The left hip is negative for fracture or dislocation. The left femur is negative for fracture. Degenerative narrowing medial compartment left knee. The visualized portion of the left hemipelvis is negative. Contrast within the urinary   bladder.   XR Knee Right 1/2 Views    Narrative    EXAM: XR KNEE LEFT 1/2 VIEWS, XR KNEE RIGHT 1/2 VIEWS  LOCATION: [facility]  DATE: 8/17/2024    INDICATION: left leg pain  COMPARISON:  None.      Impression    IMPRESSION: Mild degenerative narrowing medial compartment of both knees. No evidence for fracture on either side. No evidence for effusion on either side. Vascular calcifications bilaterally.   XR Knee Left 1/2 Views    Narrative    EXAM: XR KNEE LEFT 1/2 VIEWS, XR KNEE RIGHT 1/2 VIEWS  LOCATION: [facility]  DATE: 8/17/2024    INDICATION: left leg pain  COMPARISON: None.      Impression    IMPRESSION: Mild degenerative narrowing medial compartment of both knees. No evidence for fracture on either side. No evidence for effusion on either side. Vascular calcifications bilaterally.   Ankle XR, G/E 3 views, right    Narrative    EXAM: XR ANKLE RIGHT G/E 3 VIEWS  LOCATION: [facility]  DATE: 8/17/2024    INDICATION: right leg pain  COMPARISON: None.      Impression    IMPRESSION: Slight soft tissue swelling about the ankle but no evidence for fracture or disruption of ankle mortise.   XR Ankle Left G/E 3 Views    Narrative    EXAM: XR ANKLE LEFT G/E 3 VIEWS  LOCATION: [facility]  DATE: 8/17/2024    INDICATION: left leg pain  COMPARISON: None.      Impression    IMPRESSION: The left ankle is negative for fracture or disruption of ankle mortise. Slight soft tissue swelling about the ankle.       Discharge Medications   Current Discharge Medication List        START taking these medications    Details   cefdinir (OMNICEF) 300 MG capsule Take 1 capsule (300 mg) by mouth daily for 2 days  Qty: 2 capsule, Refills: 0    Associated Diagnoses: Acute cystitis with hematuria      QUEtiapine (SEROQUEL) 25 MG tablet Take 1 tablet (25 mg) by mouth at bedtime  Qty: 30 tablet, Refills: 0    Associated Diagnoses: Agitation           CONTINUE these medications which have NOT CHANGED    Details   acetaminophen (TYLENOL) 500 MG tablet Take 500-1,000 mg by mouth every 6 hours as needed for mild pain      amLODIPine (NORVASC) 10 MG tablet Take  10 mg by mouth daily      divalproex sodium delayed-release (DEPAKOTE SPRINKLE) 125 MG DR capsule Take 2 capsules by mouth at bedtime      gabapentin (NEURONTIN) 100 MG capsule Take 100 mg by mouth 2 times daily      melatonin 3 MG tablet Take 3 tablets by mouth at bedtime           STOP taking these medications       LORazepam (ATIVAN) 0.5 MG tablet Comments:   Reason for Stopping:             Allergies   Allergies   Allergen Reactions    Cephalexin      Other reaction(s): Seizure  8/17/24 ceftriaxone tolerated at [place] [id number] then changed to cefdinir    Lisinopril      Other reaction(s): cough

## 2024-08-20 NOTE — PROGRESS NOTES
Physical Therapy Discharge Summary    Reason for therapy discharge:    Discharged to memory care with home PT.    Progress towards therapy goal(s). See goals on Care Plan in [facility] electronic health record for goal details.  Goals partially met.  Barriers to achieving goals:   discharge from facility.    Therapy recommendation(s):    Further recommended therapy is related to documented deficits, and is necessary to maximize functional independence in order for patient to return to prior level of function.      Yulia Fonseca, BRANDY 8/20/2024

## 2024-08-20 NOTE — PLAN OF CARE
[facility] care 2300 to 0730. A&O x 1, disoriented to time, place, & situation. Assist x 2, team lift. Non-verbal indicators of pain absent. Call light within reach, able to make needs known. Bed alarm on for safety.    Problem: Dementia Signs/Symptoms  Goal: Enhanced Social or Functional Skills and Ability (Dementia Signs/Symptoms)  Outcome: Progressing     Problem: Dementia Signs/Symptoms  Goal: Improved Mood Symptoms (Dementia Signs/Symptoms)  Intervention: Optimize Emotion and Mood  Recent Flowsheet Documentation  Taken 8/20/2024 0015 by Emy Alvarez RN  Supportive Measures: active listening utilized     Problem: Fall Injury Risk  Goal: Absence of Fall and Fall-Related Injury  Intervention: Promote Injury-Free Environment  Recent Flowsheet Documentation  Taken 8/20/2024 0015 by Emy Alvarez RN  Safety Promotion/Fall Prevention:   activity supervised   assistive device/personal items within reach   increased rounding and observation   increase visualization of patient   nonskid shoes/slippers when out of bed   patient and family education   room door open   room near nurse's station   safety round/check completed

## 2024-08-20 NOTE — PLAN OF CARE
Goal Outcome Evaluation:               Pt discharged back to Memory care unit.  Discharge paperwork sent with daughter whom was transporting via car.

## 2024-08-20 NOTE — PROGRESS NOTES
PRIMARY DIAGNOSIS: GENERALIZED WEAKNESS    OUTPATIENT/OBSERVATION GOALS TO BE MET BEFORE DISCHARGE  1. Orthostatic performed: No    2. Tolerating PO medications: Yes    3. Return to near baseline physical activity: Yes  But refused ambulation in hallway this shift  4. Cleared for discharge by consultants (if involved): Yes    Discharge Planner Nurse   Safe discharge environment identified: Yes  Barriers to discharge: Yes       Entered by: Patricia Elder RN 08/19/2024 8:32 PM     Please review provider order for any additional goals.   Nurse to notify provider when observation goals have been met and patient is ready for discharge.

## 2024-08-20 NOTE — PROGRESS NOTES
"Care Management Follow Up    Length of Stay (days): 0    Expected Discharge Date: 08/20/2024     Concerns to be Addressed: Care progression - discharge planning     Patient plan of care discussed at interdisciplinary rounds: Yes    Anticipated Discharge Disposition:  Home with home care PT     Anticipated Discharge Services:  Home with home care PT  Anticipated Discharge DME:  NA    Patient/family educated on Medicare website which has current facility and service quality ratings:  NA  Education Provided on the Discharge Plan:  Yes per team  Patient/Family in Agreement with the Plan:  NA    Referrals Placed by CM/SW:  NA  Private pay costs discussed: Not applicable    Additional Information:  Per Dr. Robles, patient is ready for discharge back to Memory Care today.    0810 called [facility] [phone] and left a message for Guerita, DON    Called patient's daughter, Dorothy, and she states she spoke with Guerita yesterday. Dorothy plans to  patient between [phone].    Social Hx: \"Live at [facility] - [facility] [phone]. Dtr is primary contact. PT rec home with home care PT. Transport TBD.\"     RNCM to follow for medical progression, recommendations, and final discharge plan.     Matilde Townsend, RN     1205 called [facility] and spoke with Guerita. Guerita OK for patient's return today. Guerita request discharge orders fax to [phone].  "

## 2024-08-22 LAB
BACTERIA UR CULT: ABNORMAL

## 2024-12-28 ENCOUNTER — APPOINTMENT (OUTPATIENT)
Dept: RADIOLOGY | Facility: HOSPITAL | Age: 89
End: 2024-12-28
Attending: EMERGENCY MEDICINE
Payer: COMMERCIAL

## 2024-12-28 ENCOUNTER — APPOINTMENT (OUTPATIENT)
Dept: CT IMAGING | Facility: HOSPITAL | Age: 89
End: 2024-12-28
Attending: EMERGENCY MEDICINE
Payer: COMMERCIAL

## 2024-12-28 ENCOUNTER — HOSPITAL ENCOUNTER (EMERGENCY)
Facility: HOSPITAL | Age: 89
Discharge: NURSING, REHAB OR LONG-TERM CARE | End: 2024-12-28
Attending: EMERGENCY MEDICINE
Payer: COMMERCIAL

## 2024-12-28 VITALS
HEART RATE: 76 BPM | DIASTOLIC BLOOD PRESSURE: 58 MMHG | BODY MASS INDEX: 23.81 KG/M2 | OXYGEN SATURATION: 96 % | RESPIRATION RATE: 18 BRPM | TEMPERATURE: 97.6 F | WEIGHT: 126 LBS | SYSTOLIC BLOOD PRESSURE: 127 MMHG

## 2024-12-28 DIAGNOSIS — S62.323A CLOSED DISPLACED FRACTURE OF SHAFT OF THIRD METACARPAL BONE OF LEFT HAND, INITIAL ENCOUNTER: ICD-10-CM

## 2024-12-28 DIAGNOSIS — S62.327A CLOSED DISPLACED FRACTURE OF SHAFT OF FIFTH METACARPAL BONE OF LEFT HAND, INITIAL ENCOUNTER: ICD-10-CM
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
## 2024-12-28 PROCEDURE — 70450 CT HEAD/BRAIN W/O DYE: CPT

## 2024-12-28 PROCEDURE — 26600 TREAT METACARPAL FRACTURE: CPT | Mod: LT | Performed by: EMERGENCY MEDICINE

## 2024-12-28 PROCEDURE — 99284 EMERGENCY DEPT VISIT MOD MDM: CPT | Mod: 25 | Performed by: EMERGENCY MEDICINE

## 2024-12-28 PROCEDURE — 72125 CT NECK SPINE W/O DYE: CPT

## 2024-12-28 PROCEDURE — 73130 X-RAY EXAM OF HAND: CPT | Mod: LT

## 2024-12-28 ASSESSMENT — ACTIVITIES OF DAILY LIVING (ADL): ADLS_ACUITY_SCORE: 64

## 2024-12-28 NOTE — ED PROVIDER NOTES
EMERGENCY DEPARTMENT ENCOUNTER      NAME: oLra Ansari  AGE: 89 year old female  YOB: 1933  MRN: [id number]  EVALUATION DATE & TIME: No admission date for patient encounter.    PCP: No Ref-Primary, Physician    ED PROVIDER: Abby Davis MD    Chief Complaint   Patient presents with    Hand Injury         FINAL IMPRESSION:  1. Closed displaced fracture of shaft of third metacarpal bone of left hand, initial encounter    2. Closed displaced fracture of shaft of fifth metacarpal bone of left hand, initial encounter    3. Fall, initial encounter          ED COURSE & MEDICAL DECISION MAKING:    Pertinent Labs & Imaging studies reviewed. (See chart for details)  89 year old female with history of HTN, seizure disorder, dementia lives in memory care who presents to the Emergency Department for evaluation of fall on 12/25, injuring her left hand.  She did hit her head with her age warrants neuroimaging to rule out skull fracture or ICH, or cervical spine injury given age and likely underlying osteopenia.  CT of the head and cervical spine were obtained and unremarkable.  Her main complaint is pain to the left hand where it is swollen, ecchymotic.  Strongly suspicious for fracture.  X-ray obtained showing fracture of the third and fifth metacarpal, spiral shaft with displacement.  Placed in splint, please see procedure note.  Home with outpatient Ortho follow-up.      ED Course as of 12/28/24 1348   Sat Dec 28, 2024   1055 I met with the patient, obtained history, performed an initial exam, and discussed options and plan for diagnostics and treatment here in the ED.    1209 Head CT w/o contrast  CT head without visualized ICH   1216 XR Hand Left G/E 3 Views  X-ray independently interpreted by myself with spiral fractures of the third and fifth metacarpal shaft   1313 I decided that the patient can be discharged.       Medical Decision Making  Obtained supplemental history:Supplemental history obtained?:  Documented in chart and Family Member/Significant Other  Reviewed external records: External records reviewed?: Documented in chart and Inpatient Record: Hospital admission from 08/17/2024-08/20/2024  Care impacted by chronic illness:Dementia and Hypertension  Did you consider but not order tests?: Work up considered but not performed and documented in chart, if applicable  Did you interpret images independently?: Independent interpretation of ECG and images noted in documentation, when applicable.  Consultation discussion with other provider:Did you involve another provider (consultant, , pharmacy, etc.)?: No  Discharge. No recommendations on prescription strength medication(s). See documentation for any additional details.    MIPS: Adult Minor Head Trauma:Age 65 years or older      At the conclusion of the encounter I discussed the results of all of the tests and the disposition. The questions were answered. The patient or family acknowledged understanding and was agreeable with the care plan.      MEDICATIONS GIVEN IN THE EMERGENCY:  Medications - No data to display    NEW PRESCRIPTIONS STARTED AT TODAY'S ER VISIT  New Prescriptions    No medications on file          =================================================================    HPI    Patient information was obtained from: patient's daughter    Use of Intrepreter: N/A       Lora JAIME Ansari is a 89 year old female with pertinent medical history of hypertension, seizures, and dementia who presents with a left hand injury.    Per patient's daughter, on 12/25/2024 she fell at around 11:30 PM. Currently she lives in a memory care facility and her daughter has cameras in there so she was able to view the footage of the fall. She says she fell on her back and might have hit her head. Since the fall she has been unable to make a fist with her left hand. She had a mobile x-ray done which showed fracture to the 3rd and 5th metacarpal.    Her daughter did not  mention her taking anything for the pain today (12/28/2024). No daily medications were mentioned.    Chart review:  Per Chart Review, the patient was seen from 08/17/2024-08/20/2024 at [facility] for evaluation of difficulties walking. Her UA was consistent with a UTI and she was put on five days of cefdinir (3 days completed in hospital). Also for her dementia Zyprexa was added for her agitation and her memory care unit requested that the Seroquel be continued at discharge. X-ray was of her bilateral legs was unremarkable. CT of her c-spin had age indeterminate fractures of C7, T1, and T2.     PAST MEDICAL HISTORY:  No past medical history on file.    PAST SURGICAL HISTORY:  Past Surgical History:   Procedure Laterality Date    OOPHORECTOMY      2014    TONSILLECTOMY  1941       CURRENT MEDICATIONS:    Prior to Admission Medications   Prescriptions Last Dose Informant Patient Reported? Taking?   QUEtiapine (SEROQUEL) 25 MG tablet   No No   Sig: Take 1 tablet (25 mg) by mouth at bedtime   acetaminophen (TYLENOL) 500 MG tablet   Yes No   Sig: Take 500-1,000 mg by mouth every 6 hours as needed for mild pain   amLODIPine (NORVASC) 10 MG tablet   Yes No   Sig: Take 10 mg by mouth daily   divalproex sodium delayed-release (DEPAKOTE SPRINKLE) 125 MG DR capsule   Yes No   Sig: Take 2 capsules by mouth at bedtime   gabapentin (NEURONTIN) 100 MG capsule   Yes No   Sig: Take 100 mg by mouth 2 times daily   melatonin 3 MG tablet   Yes No   Sig: Take 3 tablets by mouth at bedtime      Facility-Administered Medications: None       ALLERGIES:  Allergies   Allergen Reactions    Cephalexin      Other reaction(s): Seizure  8/17/24 ceftriaxone tolerated at [place] [address] then changed to cefdinir    Lisinopril      Other reaction(s): cough       FAMILY HISTORY:  Family History   Problem Relation Age of Onset    Cerebrovascular Disease Mother     Coronary Artery Disease Father     Cancer Sister        SOCIAL HISTORY:  Social  History     Tobacco Use    Smoking status: Never    Smokeless tobacco: Never   Substance Use Topics    Alcohol use: No        VITALS:  Patient Vitals for the past 24 hrs:   BP Temp Temp src Pulse Resp SpO2 Weight   12/28/24 1049 127/58 97.6  F (36.4  C) Temporal 76 18 96 % 57.2 kg (126 lb)       PHYSICAL EXAM    General Appearance: Well-appearing, well-nourished, no acute distress, alert to self only   Head:  Normocephalic, atraumatic  Eyes:  PERRL, conjunctiva/corneas clear, EOM's intact  ENT:  Lips, mucosa, and tongue normal; membranes are moist without pallor  Neck:  Supple, no midline tenderness palpation  Cardio:  Regular rate and rhythm  Pulm:  No respiratory distress  Back:  No midline tenderness to palpation, no paraspinal tenderness  Extremities: significant swelling/bruising to left hand, swelling over 5th metacarpal.  Pain with palpation and decreased  strength.  Otherwise full pain-free passive range of motion of the major joints of the upper and lower extremities.  Skin:  Skin warm, dry, no rashes  Neuro: Alert to self only, baseline mental status per daughter at bedside.  Moves all extremities normally other than decreased  strength to the left hand as above.  Grossly nonfocal neuroexam.     RADIOLOGY/LABS:  Reviewed all pertinent imaging. Please see official radiology report. All pertinent labs reviewed and interpreted.    Results for orders placed or performed during the hospital encounter of 12/28/24   XR Hand Left G/E 3 Views    Impression    IMPRESSION: Acute mildly displaced oblique fracture of the fifth metacarpal shaft. Additional minimally displaced oblique fracture of the third metacarpal shaft. Rounded ossicle dorsal to the first IP joint appears chronic and may be sequelae of a remote   injury. Mild first CMC and moderate STT joint degenerative arthritis. Mild degenerative arthritis of the interphalangeal joints. Diffuse soft tissue swelling, most marked along the dorsum of the  hand.   Head CT w/o contrast    Impression    IMPRESSION:    1.  No evidence of acute intracranial hemorrhage or mass effect.  2.  Mild nonspecific white matter changes.  3.  Moderate brain parenchymal volume loss.   CT Cervical Spine w/o Contrast    Impression    IMPRESSION:  1.  Chronic superior endplate C7, T1, and T2 compression fractures, unchanged from 08/17/2024.   2.  No evidence of new acute fracture or subluxation of the cervical spine by CT imaging.       PROCEDURES:    PROCEDURE: Splint Placement   INDICATIONS: left metacarpal fracture   PROCEDURE PROVIDER: Dr Abby Davis   NOTE:  A Volar wrist splint made of Plaster was applied to the Left upper extremity by the above provider. As noted in the physical exam, distal CMS was intact prior to placement. The splint was checked and the fit was adjusted to ensure proper positioning after placement. Sensation and circulation, as well as motor function, are unchanged after splint placement and the patient is more comfortable with the splint in place.        The creation of this record is based on the scribe s observations of the work being performed by Abby Davis MD and the provider s statements to them. It was created on her behalf by Don Cooper, a trained medical scribe. This document has been checked and approved by the attending provider.    Abby Davis MD  Emergency Medicine  [facility] EMERGENCY DEPARTMENT  [facility] [facility] [address] [phone]  Dept: [phone]      Abby Davis MD  12/28/24 3641

## 2024-12-28 NOTE — ED TRIAGE NOTES
She fell three days ago injuring her left hand. Daughter reviewed the video tape and saw that she fell against the wall. No LOC according to daughter per video. Patient got back up on her own.

## 2024-12-28 NOTE — DISCHARGE INSTRUCTIONS
There are fractures of the third and fifth bones within the hand.  Do not get the splint wet.  Call to schedule outpatient orthopedic follow-up.

## 2025-01-28 ENCOUNTER — APPOINTMENT (OUTPATIENT)
Dept: CT IMAGING | Facility: HOSPITAL | Age: OVER 89
End: 2025-01-28
Attending: EMERGENCY MEDICINE
Payer: COMMERCIAL

## 2025-01-28 ENCOUNTER — HOSPITAL ENCOUNTER (EMERGENCY)
Facility: HOSPITAL | Age: OVER 89
Discharge: HOME | End: 2025-01-28
Attending: EMERGENCY MEDICINE | Admitting: EMERGENCY MEDICINE
Payer: COMMERCIAL

## 2025-01-28 VITALS
RESPIRATION RATE: 12 BRPM | TEMPERATURE: 97.4 F | OXYGEN SATURATION: 98 % | WEIGHT: 120 LBS | DIASTOLIC BLOOD PRESSURE: 78 MMHG | SYSTOLIC BLOOD PRESSURE: 133 MMHG | HEIGHT: 60 IN | BODY MASS INDEX: 23.56 KG/M2 | HEART RATE: 85 BPM

## 2025-01-28 DIAGNOSIS — S00.531A CONTUSION OF LIP, INITIAL ENCOUNTER: ICD-10-CM
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
## 2025-01-28 LAB
ANION GAP SERPL CALCULATED.3IONS-SCNC: 11 MMOL/L (ref 7–15)
BASOPHILS # BLD AUTO: 0 10E3/UL (ref 0–0.2)
BASOPHILS NFR BLD AUTO: 0 %
BUN SERPL-MCNC: 18.4 MG/DL (ref 8–23)
CALCIUM SERPL-MCNC: 10.1 MG/DL (ref 8.8–10.4)
CHLORIDE SERPL-SCNC: 107 MMOL/L (ref 98–107)
CK SERPL-CCNC: 42 U/L (ref 26–192)
CREAT SERPL-MCNC: 0.95 MG/DL (ref 0.51–0.95)
EGFRCR SERPLBLD CKD-EPI 2021: 56 ML/MIN/1.73M2
EOSINOPHIL # BLD AUTO: 0.1 10E3/UL (ref 0–0.7)
EOSINOPHIL NFR BLD AUTO: 2 %
ERYTHROCYTE [DISTWIDTH] IN BLOOD BY AUTOMATED COUNT: 14.7 % (ref 10–15)
GLUCOSE SERPL-MCNC: 100 MG/DL (ref 70–99)
HCO3 SERPL-SCNC: 25 MMOL/L (ref 22–29)
HCT VFR BLD AUTO: 40.4 % (ref 35–47)
HGB BLD-MCNC: 13.4 G/DL (ref 11.7–15.7)
IMM GRANULOCYTES # BLD: 0 10E3/UL
IMM GRANULOCYTES NFR BLD: 0 %
LYMPHOCYTES # BLD AUTO: 1 10E3/UL (ref 0.8–5.3)
LYMPHOCYTES NFR BLD AUTO: 18 %
MAGNESIUM SERPL-MCNC: 2.4 MG/DL (ref 1.7–2.3)
MCH RBC QN AUTO: 31 PG (ref 26.5–33)
MCHC RBC AUTO-ENTMCNC: 33.2 G/DL (ref 31.5–36.5)
MCV RBC AUTO: 94 FL (ref 78–100)
MONOCYTES # BLD AUTO: 0.8 10E3/UL (ref 0–1.3)
MONOCYTES NFR BLD AUTO: 14 %
NEUTROPHILS # BLD AUTO: 3.8 10E3/UL (ref 1.6–8.3)
NEUTROPHILS NFR BLD AUTO: 67 %
NRBC # BLD AUTO: 0 10E3/UL
NRBC BLD AUTO-RTO: 0 /100
PLATELET # BLD AUTO: 158 10E3/UL (ref 150–450)
POTASSIUM SERPL-SCNC: 4.5 MMOL/L (ref 3.4–5.3)
RBC # BLD AUTO: 4.32 10E6/UL (ref 3.8–5.2)
SODIUM SERPL-SCNC: 143 MMOL/L (ref 135–145)
WBC # BLD AUTO: 5.6 10E3/UL (ref 4–11)

## 2025-01-28 PROCEDURE — 80048 BASIC METABOLIC PNL TOTAL CA: CPT | Performed by: EMERGENCY MEDICINE

## 2025-01-28 PROCEDURE — 82550 ASSAY OF CK (CPK): CPT | Performed by: EMERGENCY MEDICINE

## 2025-01-28 PROCEDURE — 85004 AUTOMATED DIFF WBC COUNT: CPT | Performed by: EMERGENCY MEDICINE

## 2025-01-28 PROCEDURE — 93005 ELECTROCARDIOGRAM TRACING: CPT | Performed by: EMERGENCY MEDICINE

## 2025-01-28 PROCEDURE — 85048 AUTOMATED LEUKOCYTE COUNT: CPT | Performed by: EMERGENCY MEDICINE

## 2025-01-28 PROCEDURE — 99285 EMERGENCY DEPT VISIT HI MDM: CPT | Mod: 25

## 2025-01-28 PROCEDURE — 72125 CT NECK SPINE W/O DYE: CPT

## 2025-01-28 PROCEDURE — 36415 COLL VENOUS BLD VENIPUNCTURE: CPT | Performed by: EMERGENCY MEDICINE

## 2025-01-28 PROCEDURE — 85018 HEMOGLOBIN: CPT | Performed by: EMERGENCY MEDICINE

## 2025-01-28 PROCEDURE — 83735 ASSAY OF MAGNESIUM: CPT | Performed by: EMERGENCY MEDICINE

## 2025-01-28 PROCEDURE — 70450 CT HEAD/BRAIN W/O DYE: CPT

## 2025-01-28 ASSESSMENT — COLUMBIA-SUICIDE SEVERITY RATING SCALE - C-SSRS
1. IN THE PAST MONTH, HAVE YOU WISHED YOU WERE DEAD OR WISHED YOU COULD GO TO SLEEP AND NOT WAKE UP?: NO
2. HAVE YOU ACTUALLY HAD ANY THOUGHTS OF KILLING YOURSELF IN THE PAST MONTH?: NO
6. HAVE YOU EVER DONE ANYTHING, STARTED TO DO ANYTHING, OR PREPARED TO DO ANYTHING TO END YOUR LIFE?: NO

## 2025-01-28 ASSESSMENT — ACTIVITIES OF DAILY LIVING (ADL): ADLS_ACUITY_SCORE: 64

## 2025-01-28 NOTE — ED TRIAGE NOTES
Patient here for evaluation of an unwitnessed fall that occurred approximately at about 11 pm last night. Her daughter has a security camera in her memory care apartment and found her crawling on her floor. A nurse's aid found her on floor at about midnight. Patient has bruising to her chin and appears to have injured her nose. She does not take any blood thinners.      Triage Assessment (Adult)       Row Name 01/28/25 1101          Triage Assessment    Airway WDL WDL        Respiratory WDL    Respiratory WDL WDL        Skin Circulation/Temperature WDL    Skin Circulation/Temperature WDL WDL        Cardiac WDL    Cardiac WDL WDL     Cardiac Rhythm NSR        Peripheral/Neurovascular WDL    Peripheral Neurovascular WDL WDL        Cognitive/Neuro/Behavioral WDL    Cognitive/Neuro/Behavioral WDL WDL

## 2025-01-28 NOTE — ED PROVIDER NOTES
EMERGENCY DEPARTMENT ENCOUNTER      NAME: Lora Ansari  AGE: 89 year old female  YOB: 1933  MRN: [id number]  EVALUATION DATE & TIME: No admission date for patient encounter.    PCP: No Ref-Primary, Physician    ED PROVIDER: Abby Davis MD    Chief Complaint   Patient presents with    Fall         FINAL IMPRESSION:  1. Fall, initial encounter    2. Contusion of lip, initial encounter          ED COURSE & MEDICAL DECISION MAKING:    Pertinent Labs & Imaging studies reviewed. (See chart for details)  89 year old female with history of HTN, seizures, dementia who lives in memory care facility who presents to the Emergency Department for evaluation of unwitnessed fall at some point overnight.  Falls frequently, favor this to be mechanical.  However concern for closed head injury, skull fracture, ICH, cervical spine injury, dehydration or electrolyte abnormality.  Less likely arrhythmia.    Patient initially seen evaluated by myself in triage area due to boarding crisis.  Twelve-lead EKG was obtained showing sinus rhythm.  Laboratory workup unremarkable.  CT of the head and neck unremarkable.  Daughter at bedside states that patient is at mental status baseline and she is up and ambulatory here and safe for discharge home.      ED Course as of 01/28/25 1449   e Jan 28, 2025   1246 CT Head w/o Contrast  CT head independently interpreted by myself without visualized ICH       Medical Decision Making  Obtained supplemental history:Supplemental history obtained?: Family Member/Significant Other  Reviewed external records: External records reviewed?:  Office visit today, recommend ED visit  Care impacted by chronic illness:Chronic Kidney Disease, Chronic Pain, Dementia, and Hypertension  Did you consider but not order tests?: Work up considered but not performed and documented in chart, if applicable  Did you interpret images independently?: Independent interpretation of ECG and images noted in  documentation, when applicable.  Consultation discussion with other provider:Did you involve another provider (consultant, MH, pharmacy, etc.)?: No  Discharge. No recommendations on prescription strength medication(s). See documentation for any additional details.    MIPS: Adult Minor Head Trauma:Age 65 years or older      At the conclusion of the encounter I discussed the results of all of the tests and the disposition. The questions were answered. The patient or family acknowledged understanding and was agreeable with the care plan.    MEDICATIONS GIVEN IN THE EMERGENCY:  Medications - No data to display    NEW PRESCRIPTIONS STARTED AT TODAY'S ER VISIT  Discharge Medication List as of 1/28/2025  1:39 PM             =================================================================    HPI    Patient information was obtained from: Patient and family member    Use of Intrepreter: N/A       Lora Ansari is a 89 year old female with pertinent medical history of dementia, seizures, hypertension, chronic pain, and senile osteoporosis who presents for evaluation after fall.     Patient is pleasantly demented and presents from memory care after having an unwitnessed fall last night. She is supposed to be checked on every 2 hours throughout the night, but the nursing staff was unable to tell the family member when she was last well. She was found this morning crawling, and there was some blood on the floor. Patient has no complaints and is unaware of the fall per her mental status.     Patient denies any other complaints at this time.     PAST MEDICAL HISTORY:  No past medical history on file.    PAST SURGICAL HISTORY:  Past Surgical History:   Procedure Laterality Date    OOPHORECTOMY      2014    TONSILLECTOMY  1941       CURRENT MEDICATIONS:    Prior to Admission Medications   Prescriptions Last Dose Informant Patient Reported? Taking?   QUEtiapine (SEROQUEL) 25 MG tablet   No No   Sig: Take 1 tablet (25 mg) by mouth  at bedtime   acetaminophen (TYLENOL) 500 MG tablet   Yes No   Sig: Take 500-1,000 mg by mouth every 6 hours as needed for mild pain   amLODIPine (NORVASC) 10 MG tablet   Yes No   Sig: Take 10 mg by mouth daily   divalproex sodium delayed-release (DEPAKOTE SPRINKLE) 125 MG DR capsule   Yes No   Sig: Take 2 capsules by mouth at bedtime   gabapentin (NEURONTIN) 100 MG capsule   Yes No   Sig: Take 100 mg by mouth 2 times daily   melatonin 3 MG tablet   Yes No   Sig: Take 3 tablets by mouth at bedtime      Facility-Administered Medications: None       ALLERGIES:  Allergies   Allergen Reactions    Cephalexin      Other reaction(s): Seizure  8/17/24 ceftriaxone tolerated at [place] [address] then changed to cefdinir    Lisinopril      Other reaction(s): cough       FAMILY HISTORY:  Family History   Problem Relation Age of Onset    Cerebrovascular Disease Mother     Coronary Artery Disease Father     Cancer Sister        SOCIAL HISTORY:  Social History     Tobacco Use    Smoking status: Never    Smokeless tobacco: Never   Substance Use Topics    Alcohol use: No        VITALS:  Patient Vitals for the past 24 hrs:   BP Temp Temp src Pulse Resp SpO2 Height Weight   01/28/25 1259 133/78 -- -- 85 12 98 % -- --   01/28/25 1059 (!) 142/94 97.4  F (36.3  C) Oral 88 18 95 % 1.524 m (5') 54.4 kg (120 lb)       PHYSICAL EXAM    General Appearance: Well-appearing, well-nourished, no acute distress.  Head:  Normocephalic, no loose teeth, bruising to lower lip and mucosal surface.   Eyes:  PERRL, conjunctiva/corneas clear, EOM's intact  ENT:  Lips, mucosa, and tongue normal; membranes are moist without pallor.   Neck:  Supple, no midline tenderness palpation  Chest:  No tenderness or deformity, no crepitus  Cardio:  Regular rate and rhythm, no murmur/gallop/rub  Pulm:  No respiratory distress, clear to auscultation bilaterally  Back:  No midline tenderness to palpation, no paraspinal tenderness  Abdomen:  Soft, non-tender, non distended,no  rebound or guarding.  Extremities: Slow gait.  Range of motion of the extremities unremarkable  Skin:  Skin warm, dry, no rashes  Neuro:  Alert and oriented to person, place but not year.  Baseline mental status per daughter at bedside, moving all extremities, no gross sensory defects, pleasantly demented.      RADIOLOGY/LABS:  Reviewed all pertinent imaging. Please see official radiology report. All pertinent labs reviewed and interpreted.    Results for orders placed or performed during the hospital encounter of 01/28/25   CT Head w/o Contrast    Impression    IMPRESSION:  1.  No CT evidence for acute intracranial process.  2.  Brain atrophy and presumed chronic microvascular ischemic changes as above.   CT Cervical Spine w/o Contrast    Impression    IMPRESSION:  1.  No acute fracture or posttraumatic subluxation.  2.  Chronic C7, T1, and T2 superior endplate compression fractures.  3.  No high-grade spinal canal or neural foraminal stenosis.   Basic metabolic panel   Result Value Ref Range    Sodium 143 135 - 145 mmol/L    Potassium 4.5 3.4 - 5.3 mmol/L    Chloride 107 98 - 107 mmol/L    Carbon Dioxide (CO2) 25 22 - 29 mmol/L    Anion Gap 11 7 - 15 mmol/L    Urea Nitrogen 18.4 8.0 - 23.0 mg/dL    Creatinine 0.95 0.51 - 0.95 mg/dL    GFR Estimate 56 (L) >60 mL/min/1.73m2    Calcium 10.1 8.8 - 10.4 mg/dL    Glucose 100 (H) 70 - 99 mg/dL   Result Value Ref Range    Magnesium 2.4 (H) 1.7 - 2.3 mg/dL   Result Value Ref Range    CK 42 26 - 192 U/L   CBC with platelets and differential   Result Value Ref Range    WBC Count 5.6 4.0 - 11.0 10e3/uL    RBC Count 4.32 3.80 - 5.20 10e6/uL    Hemoglobin 13.4 11.7 - 15.7 g/dL    Hematocrit 40.4 35.0 - 47.0 %    MCV 94 78 - 100 fL    MCH 31.0 26.5 - 33.0 pg    MCHC 33.2 31.5 - 36.5 g/dL    RDW 14.7 10.0 - 15.0 %    Platelet Count 158 150 - 450 10e3/uL    % Neutrophils 67 %    % Lymphocytes 18 %    % Monocytes 14 %    % Eosinophils 2 %    % Basophils 0 %    % Immature  Granulocytes 0 %    NRBCs per 100 WBC 0 <1 /100    Absolute Neutrophils 3.8 1.6 - 8.3 10e3/uL    Absolute Lymphocytes 1.0 0.8 - 5.3 10e3/uL    Absolute Monocytes 0.8 0.0 - 1.3 10e3/uL    Absolute Eosinophils 0.1 0.0 - 0.7 10e3/uL    Absolute Basophils 0.0 0.0 - 0.2 10e3/uL    Absolute Immature Granulocytes 0.0 <=0.4 10e3/uL    Absolute NRBCs 0.0 10e3/uL       EKG:  Performed at: 11:53:14 on 28-Jan-2025    Impression: Sinus rhythm. Left axis deviation. Left ventricular hypertrophy with QRS widening. Inferior infarct (cited on or before 29-Dec-1998). Anterolateral infarct (cited on or before 28-Sep-2015). Abnormal ECG.     Rate: 87 BPM  Rhythm: Sinus rhythm.   Axis: 18 -75 13  MS Interval: 168 ms  QRS Interval: 116 ms  QTc Interval: 384/462 ms  ST Changes: None  Comparison: When compared with ECG of 17-Aug-2024 14:13, Premature supraventricular complexes are no longer present.   I have independently reviewed and interpreted the EKG(s) documented above.      The creation of this record is based on the scribe s observations of the work being performed by Abby Davis MD and the provider s statements to them. It was created on her behalf by Macario Romano, a trained medical scribe. This document has been checked and approved by the attending provider.    Abby Davis MD  Emergency Medicine  [facility] EMERGENCY DEPARTMENT  [facility] [facility] [address] [phone]  Dept: [phone]     Abby Davis MD  01/28/25 9475

## 2025-02-01 LAB
ATRIAL RATE - MUSE: 87 BPM
DIASTOLIC BLOOD PRESSURE - MUSE: NORMAL MMHG
INTERPRETATION ECG - MUSE: NORMAL
P AXIS - MUSE: 18 DEGREES
PR INTERVAL - MUSE: 168 MS
QRS DURATION - MUSE: 116 MS
QT - MUSE: 384 MS
QTC - MUSE: 462 MS
R AXIS - MUSE: -75 DEGREES
SYSTOLIC BLOOD PRESSURE - MUSE: NORMAL MMHG
T AXIS - MUSE: 13 DEGREES
VENTRICULAR RATE- MUSE: 87 BPM